# Patient Record
Sex: FEMALE | Race: WHITE | NOT HISPANIC OR LATINO
[De-identification: names, ages, dates, MRNs, and addresses within clinical notes are randomized per-mention and may not be internally consistent; named-entity substitution may affect disease eponyms.]

---

## 2018-10-02 ENCOUNTER — APPOINTMENT (OUTPATIENT)
Dept: MRI IMAGING | Facility: IMAGING CENTER | Age: 68
End: 2018-10-02
Payer: MEDICARE

## 2018-10-02 ENCOUNTER — OUTPATIENT (OUTPATIENT)
Dept: OUTPATIENT SERVICES | Facility: HOSPITAL | Age: 68
LOS: 1 days | End: 2018-10-02
Payer: MEDICARE

## 2018-10-02 DIAGNOSIS — Z00.8 ENCOUNTER FOR OTHER GENERAL EXAMINATION: ICD-10-CM

## 2018-10-02 DIAGNOSIS — Z90.710 ACQUIRED ABSENCE OF BOTH CERVIX AND UTERUS: Chronic | ICD-10-CM

## 2018-10-02 DIAGNOSIS — Z90.49 ACQUIRED ABSENCE OF OTHER SPECIFIED PARTS OF DIGESTIVE TRACT: Chronic | ICD-10-CM

## 2018-10-02 DIAGNOSIS — Z98.89 OTHER SPECIFIED POSTPROCEDURAL STATES: Chronic | ICD-10-CM

## 2018-10-02 PROCEDURE — 82565 ASSAY OF CREATININE: CPT

## 2018-10-02 PROCEDURE — 74182 MRI ABDOMEN W/CONTRAST: CPT

## 2018-10-02 PROCEDURE — 74182 MRI ABDOMEN W/CONTRAST: CPT | Mod: 26

## 2018-10-02 PROCEDURE — A9585: CPT

## 2019-01-06 ENCOUNTER — FORM ENCOUNTER (OUTPATIENT)
Age: 69
End: 2019-01-06

## 2019-01-07 ENCOUNTER — APPOINTMENT (OUTPATIENT)
Dept: RHEUMATOLOGY | Facility: CLINIC | Age: 69
End: 2019-01-07
Payer: MEDICARE

## 2019-01-07 VITALS
WEIGHT: 275 LBS | DIASTOLIC BLOOD PRESSURE: 86 MMHG | BODY MASS INDEX: 46.95 KG/M2 | HEART RATE: 68 BPM | TEMPERATURE: 97.9 F | SYSTOLIC BLOOD PRESSURE: 176 MMHG | OXYGEN SATURATION: 95 % | HEIGHT: 64 IN

## 2019-01-07 DIAGNOSIS — G89.29 PAIN IN LEFT SHOULDER: ICD-10-CM

## 2019-01-07 DIAGNOSIS — R76.8 OTHER SPECIFIED ABNORMAL IMMUNOLOGICAL FINDINGS IN SERUM: ICD-10-CM

## 2019-01-07 DIAGNOSIS — M35.00 SICCA SYNDROME, UNSPECIFIED: ICD-10-CM

## 2019-01-07 DIAGNOSIS — R70.0 ELEVATED ERYTHROCYTE SEDIMENTATION RATE: ICD-10-CM

## 2019-01-07 DIAGNOSIS — M25.512 PAIN IN LEFT SHOULDER: ICD-10-CM

## 2019-01-07 DIAGNOSIS — Z86.79 PERSONAL HISTORY OF OTHER DISEASES OF THE CIRCULATORY SYSTEM: ICD-10-CM

## 2019-01-07 DIAGNOSIS — Z86.39 PERSONAL HISTORY OF OTHER ENDOCRINE, NUTRITIONAL AND METABOLIC DISEASE: ICD-10-CM

## 2019-01-07 DIAGNOSIS — Z82.49 FAMILY HISTORY OF ISCHEMIC HEART DISEASE AND OTHER DISEASES OF THE CIRCULATORY SYSTEM: ICD-10-CM

## 2019-01-07 DIAGNOSIS — M17.0 BILATERAL PRIMARY OSTEOARTHRITIS OF KNEE: ICD-10-CM

## 2019-01-07 PROCEDURE — 73030 X-RAY EXAM OF SHOULDER: CPT | Mod: LT

## 2019-01-07 PROCEDURE — 99204 OFFICE O/P NEW MOD 45 MIN: CPT

## 2019-01-07 RX ORDER — INSULIN DEGLUDEC INJECTION 100 U/ML
100 INJECTION, SOLUTION SUBCUTANEOUS
Qty: 15 | Refills: 0 | Status: ACTIVE | COMMUNITY
Start: 2018-08-28

## 2019-01-07 RX ORDER — METOPROLOL SUCCINATE 50 MG/1
50 TABLET, EXTENDED RELEASE ORAL
Refills: 0 | Status: ACTIVE | COMMUNITY

## 2019-01-07 RX ORDER — INSULIN ASPART 100 [IU]/ML
INJECTION, SOLUTION INTRAVENOUS; SUBCUTANEOUS
Refills: 0 | Status: ACTIVE | COMMUNITY

## 2019-01-07 RX ORDER — LEVOFLOXACIN 250 MG/1
250 TABLET, FILM COATED ORAL
Qty: 14 | Refills: 0 | Status: ACTIVE | COMMUNITY
Start: 2018-07-21

## 2019-01-07 RX ORDER — ASPIRIN 81 MG
81 TABLET, DELAYED RELEASE (ENTERIC COATED) ORAL
Refills: 0 | Status: ACTIVE | COMMUNITY

## 2019-01-07 NOTE — HISTORY OF PRESENT ILLNESS
[Chills] : chills [Fatigue] : fatigue [Depression] : depression [Malar Facial Rash] : malar facial rash [Dry Mouth] : dry mouth [Difficulty Standing] : difficulty standing [Difficulty Walking] : difficulty walking [Myalgias] : myalgias [Dry Eyes] : dry eyes [Anorexia] : no anorexia [Weight Loss] : no weight loss [Malaise] : no malaise [Fever] : no fever [Skin Lesions] : no lesions [Skin Nodules] : no skin nodules [Oral Ulcers] : no oral ulcers [Cough] : no cough [Dysphonia] : no dysphonia [Dysphagia] : no dysphagia [Shortness of Breath] : no shortness of breath [Falls] : no falls [Dyspnea] : no dyspnea [Muscle Weakness] : no muscle weakness [Muscle Spasms] : no muscle spasms [Muscle Cramping] : no muscle cramping [Visual Changes] : no visual changes [Eye Pain] : no eye pain [Eye Redness] : no eye redness [FreeTextEntry1] : No Raynaud's, no miscarriages, no thrombosis.

## 2019-01-07 NOTE — CONSULT LETTER
[Dear  ___] : Dear  [unfilled], [Consult Letter:] : I had the pleasure of evaluating your patient, [unfilled]. [Please see my note below.] : Please see my note below. [Consult Closing:] : Thank you very much for allowing me to participate in the care of this patient.  If you have any questions, please do not hesitate to contact me. [Sincerely,] : Sincerely, [FreeTextEntry3] : Dr. Silvia Simpson \par Rheumatology attending\par [facility]\par

## 2019-01-07 NOTE — ASSESSMENT
[FreeTextEntry1] : 1. Rash--doubt SLE related rash, most consistent with rosacea clinically\par 2. Joint pain--evidence of DJD on x-rays in the past\par obtain x-ray of the left shoulder although will likely need additional imaging such as MRI if x-ray is negative; suspect adhesive capsulitis\par 3. given joint pain, sicca symptoms, prolonged morning stiffness, and positive ds DNA antibodies, will check additional serology to evaluated for possible SLE/RA\par will call with results\par OV 6 weeks, sooner PRN

## 2019-01-11 LAB
25(OH)D3 SERPL-MCNC: 19.5 NG/ML
ALBUMIN SERPL ELPH-MCNC: 3.9 G/DL
ALP BLD-CCNC: 107 U/L
ALT SERPL-CCNC: 22 U/L
ANA PAT FLD IF-IMP: ABNORMAL
ANA SER IF-ACNC: ABNORMAL
ANION GAP SERPL CALC-SCNC: 13 MMOL/L
APPEARANCE: CLEAR
APTT BLD: 32.3 SEC
AST SERPL-CCNC: 14 U/L
B2 GLYCOPROT1 AB SER QL: NEGATIVE
BACTERIA: ABNORMAL
BASOPHILS # BLD AUTO: 0.03 K/UL
BASOPHILS NFR BLD AUTO: 0.3 %
BILIRUB SERPL-MCNC: 0.4 MG/DL
BILIRUBIN URINE: NEGATIVE
BLOOD URINE: NEGATIVE
BUN SERPL-MCNC: 24 MG/DL
CALCIUM SERPL-MCNC: 9.6 MG/DL
CARDIOLIPIN AB SER IA-ACNC: NEGATIVE
CCP AB SER IA-ACNC: <8 UNITS
CENTROMERE IGG SER-ACNC: <0.2 AL
CHLORIDE SERPL-SCNC: 97 MMOL/L
CO2 SERPL-SCNC: 27 MMOL/L
COLOR: YELLOW
CREAT SERPL-MCNC: 1.08 MG/DL
CREAT SPEC-SCNC: 81 MG/DL
CREAT/PROT UR: 0.3 RATIO
CRP SERPL-MCNC: 2.97 MG/DL
DSDNA AB SER-ACNC: 23 IU/ML
ENA RNP AB SER IA-ACNC: <0.2 AL
ENA SCL70 IGG SER IA-ACNC: <0.2 AL
ENA SM AB SER IA-ACNC: <0.2 AL
ENA SS-A AB SER IA-ACNC: 0.2 AL
ENA SS-B AB SER IA-ACNC: <0.2 AL
EOSINOPHIL # BLD AUTO: 0.5 K/UL
EOSINOPHIL NFR BLD AUTO: 4.5 %
ERYTHROCYTE [SEDIMENTATION RATE] IN BLOOD BY WESTERGREN METHOD: 110 MM/HR
FOLATE SERPL-MCNC: 7.4 NG/ML
GLUCOSE QUALITATIVE U: 500 MG/DL
GLUCOSE SERPL-MCNC: 213 MG/DL
HCT VFR BLD CALC: 43.7 %
HGB BLD-MCNC: 13.7 G/DL
IMM GRANULOCYTES NFR BLD AUTO: 0.3 %
KETONES URINE: NEGATIVE
LEUKOCYTE ESTERASE URINE: NEGATIVE
LYMPHOCYTES # BLD AUTO: 2.13 K/UL
LYMPHOCYTES NFR BLD AUTO: 19.3 %
MAN DIFF?: NORMAL
MCHC RBC-ENTMCNC: 27.8 PG
MCHC RBC-ENTMCNC: 31.4 GM/DL
MCV RBC AUTO: 88.6 FL
MICROSCOPIC-UA: NORMAL
MONOCYTES # BLD AUTO: 0.73 K/UL
MONOCYTES NFR BLD AUTO: 6.6 %
NEUTROPHILS # BLD AUTO: 7.59 K/UL
NEUTROPHILS NFR BLD AUTO: 69 %
NITRITE URINE: NEGATIVE
PH URINE: 6.5
PLATELET # BLD AUTO: 288 K/UL
POTASSIUM SERPL-SCNC: 5.1 MMOL/L
PROT SERPL-MCNC: 8 G/DL
PROT UR-MCNC: 23 MG/DL
PROTEIN URINE: NEGATIVE MG/DL
RBC # BLD: 4.93 M/UL
RBC # FLD: 14.9 %
RED BLOOD CELLS URINE: 1 /HPF
RF+CCP IGG SER-IMP: NEGATIVE
RHEUMATOID FACT SER QL: <10 IU/ML
SODIUM SERPL-SCNC: 137 MMOL/L
SPECIFIC GRAVITY URINE: 1.02
SQUAMOUS EPITHELIAL CELLS: 2 /HPF
THYROGLOB AB SERPL-ACNC: <20 IU/ML
THYROPEROXIDASE AB SERPL IA-ACNC: 13.6 IU/ML
TSH SERPL-ACNC: 2.36 UIU/ML
UROBILINOGEN URINE: NEGATIVE MG/DL
VIT B12 SERPL-MCNC: 564 PG/ML
WBC # FLD AUTO: 11.01 K/UL
WHITE BLOOD CELLS URINE: 4 /HPF

## 2019-01-14 DIAGNOSIS — R53.83 OTHER FATIGUE: ICD-10-CM

## 2019-01-14 DIAGNOSIS — R70.0 ELEVATED ERYTHROCYTE SEDIMENTATION RATE: ICD-10-CM

## 2019-01-23 LAB — RNA POLYMERASE III IGG: <10 U

## 2019-03-04 ENCOUNTER — APPOINTMENT (OUTPATIENT)
Dept: RHEUMATOLOGY | Facility: CLINIC | Age: 69
End: 2019-03-04

## 2019-04-27 ENCOUNTER — APPOINTMENT (OUTPATIENT)
Dept: MAMMOGRAPHY | Facility: IMAGING CENTER | Age: 69
End: 2019-04-27
Payer: MEDICARE

## 2019-04-27 ENCOUNTER — OUTPATIENT (OUTPATIENT)
Dept: OUTPATIENT SERVICES | Facility: HOSPITAL | Age: 69
LOS: 1 days | End: 2019-04-27
Payer: MEDICARE

## 2019-04-27 ENCOUNTER — APPOINTMENT (OUTPATIENT)
Dept: ULTRASOUND IMAGING | Facility: IMAGING CENTER | Age: 69
End: 2019-04-27
Payer: MEDICARE

## 2019-04-27 DIAGNOSIS — Z90.49 ACQUIRED ABSENCE OF OTHER SPECIFIED PARTS OF DIGESTIVE TRACT: Chronic | ICD-10-CM

## 2019-04-27 DIAGNOSIS — Z90.710 ACQUIRED ABSENCE OF BOTH CERVIX AND UTERUS: Chronic | ICD-10-CM

## 2019-04-27 DIAGNOSIS — Z00.8 ENCOUNTER FOR OTHER GENERAL EXAMINATION: ICD-10-CM

## 2019-04-27 DIAGNOSIS — Z98.89 OTHER SPECIFIED POSTPROCEDURAL STATES: Chronic | ICD-10-CM

## 2019-04-27 PROCEDURE — 76641 ULTRASOUND BREAST COMPLETE: CPT | Mod: 26,50

## 2019-04-27 PROCEDURE — 77067 SCR MAMMO BI INCL CAD: CPT

## 2019-04-27 PROCEDURE — 77063 BREAST TOMOSYNTHESIS BI: CPT | Mod: 26

## 2019-04-27 PROCEDURE — 77067 SCR MAMMO BI INCL CAD: CPT | Mod: 26

## 2019-04-27 PROCEDURE — 76641 ULTRASOUND BREAST COMPLETE: CPT

## 2019-04-27 PROCEDURE — 77063 BREAST TOMOSYNTHESIS BI: CPT

## 2019-05-13 ENCOUNTER — APPOINTMENT (OUTPATIENT)
Dept: CT IMAGING | Facility: IMAGING CENTER | Age: 69
End: 2019-05-13
Payer: MEDICARE

## 2019-05-13 ENCOUNTER — APPOINTMENT (OUTPATIENT)
Dept: NUCLEAR MEDICINE | Facility: IMAGING CENTER | Age: 69
End: 2019-05-13
Payer: MEDICARE

## 2019-05-13 ENCOUNTER — OUTPATIENT (OUTPATIENT)
Dept: OUTPATIENT SERVICES | Facility: HOSPITAL | Age: 69
LOS: 1 days | End: 2019-05-13
Payer: MEDICARE

## 2019-05-13 DIAGNOSIS — Z00.8 ENCOUNTER FOR OTHER GENERAL EXAMINATION: ICD-10-CM

## 2019-05-13 DIAGNOSIS — Z98.89 OTHER SPECIFIED POSTPROCEDURAL STATES: Chronic | ICD-10-CM

## 2019-05-13 DIAGNOSIS — Z90.49 ACQUIRED ABSENCE OF OTHER SPECIFIED PARTS OF DIGESTIVE TRACT: Chronic | ICD-10-CM

## 2019-05-13 DIAGNOSIS — Z90.710 ACQUIRED ABSENCE OF BOTH CERVIX AND UTERUS: Chronic | ICD-10-CM

## 2019-05-13 PROCEDURE — 70491 CT SOFT TISSUE NECK W/DYE: CPT | Mod: 26

## 2019-05-13 PROCEDURE — A9552: CPT

## 2019-05-13 PROCEDURE — 78816 PET IMAGE W/CT FULL BODY: CPT | Mod: 26,PI

## 2019-05-13 PROCEDURE — 70491 CT SOFT TISSUE NECK W/DYE: CPT

## 2019-05-13 PROCEDURE — 78816 PET IMAGE W/CT FULL BODY: CPT

## 2019-05-13 PROCEDURE — 82565 ASSAY OF CREATININE: CPT

## 2019-07-14 ENCOUNTER — EMERGENCY (EMERGENCY)
Facility: HOSPITAL | Age: 69
LOS: 1 days | Discharge: HOME | End: 2019-07-14
Attending: EMERGENCY MEDICINE | Admitting: EMERGENCY MEDICINE
Payer: MEDICARE

## 2019-07-14 VITALS
OXYGEN SATURATION: 98 % | TEMPERATURE: 99 F | HEART RATE: 74 BPM | SYSTOLIC BLOOD PRESSURE: 196 MMHG | DIASTOLIC BLOOD PRESSURE: 73 MMHG | RESPIRATION RATE: 18 BRPM

## 2019-07-14 VITALS
OXYGEN SATURATION: 99 % | HEART RATE: 71 BPM | SYSTOLIC BLOOD PRESSURE: 214 MMHG | RESPIRATION RATE: 18 BRPM | DIASTOLIC BLOOD PRESSURE: 82 MMHG | TEMPERATURE: 98 F

## 2019-07-14 DIAGNOSIS — Z98.89 OTHER SPECIFIED POSTPROCEDURAL STATES: Chronic | ICD-10-CM

## 2019-07-14 DIAGNOSIS — Z90.710 ACQUIRED ABSENCE OF BOTH CERVIX AND UTERUS: Chronic | ICD-10-CM

## 2019-07-14 DIAGNOSIS — Z90.49 ACQUIRED ABSENCE OF OTHER SPECIFIED PARTS OF DIGESTIVE TRACT: Chronic | ICD-10-CM

## 2019-07-14 LAB
ALBUMIN SERPL ELPH-MCNC: 3.6 G/DL — SIGNIFICANT CHANGE UP (ref 3.3–5)
ALP SERPL-CCNC: 104 U/L — SIGNIFICANT CHANGE UP (ref 40–120)
ALT FLD-CCNC: 23 U/L — SIGNIFICANT CHANGE UP (ref 4–33)
ANION GAP SERPL CALC-SCNC: 12 MMO/L — SIGNIFICANT CHANGE UP (ref 7–14)
AST SERPL-CCNC: 22 U/L — SIGNIFICANT CHANGE UP (ref 4–32)
BASOPHILS # BLD AUTO: 0.05 K/UL — SIGNIFICANT CHANGE UP (ref 0–0.2)
BASOPHILS NFR BLD AUTO: 0.5 % — SIGNIFICANT CHANGE UP (ref 0–2)
BILIRUB SERPL-MCNC: 0.4 MG/DL — SIGNIFICANT CHANGE UP (ref 0.2–1.2)
BUN SERPL-MCNC: 19 MG/DL — SIGNIFICANT CHANGE UP (ref 7–23)
CALCIUM SERPL-MCNC: 9.6 MG/DL — SIGNIFICANT CHANGE UP (ref 8.4–10.5)
CHLORIDE SERPL-SCNC: 100 MMOL/L — SIGNIFICANT CHANGE UP (ref 98–107)
CO2 SERPL-SCNC: 25 MMOL/L — SIGNIFICANT CHANGE UP (ref 22–31)
CREAT SERPL-MCNC: 0.98 MG/DL — SIGNIFICANT CHANGE UP (ref 0.5–1.3)
EOSINOPHIL # BLD AUTO: 0.22 K/UL — SIGNIFICANT CHANGE UP (ref 0–0.5)
EOSINOPHIL NFR BLD AUTO: 2 % — SIGNIFICANT CHANGE UP (ref 0–6)
GLUCOSE SERPL-MCNC: 194 MG/DL — HIGH (ref 70–99)
HCT VFR BLD CALC: 42.7 % — SIGNIFICANT CHANGE UP (ref 34.5–45)
HGB BLD-MCNC: 13.4 G/DL — SIGNIFICANT CHANGE UP (ref 11.5–15.5)
IMM GRANULOCYTES NFR BLD AUTO: 0.5 % — SIGNIFICANT CHANGE UP (ref 0–1.5)
LYMPHOCYTES # BLD AUTO: 2.38 K/UL — SIGNIFICANT CHANGE UP (ref 1–3.3)
LYMPHOCYTES # BLD AUTO: 21.8 % — SIGNIFICANT CHANGE UP (ref 13–44)
MCHC RBC-ENTMCNC: 27.8 PG — SIGNIFICANT CHANGE UP (ref 27–34)
MCHC RBC-ENTMCNC: 31.4 % — LOW (ref 32–36)
MCV RBC AUTO: 88.6 FL — SIGNIFICANT CHANGE UP (ref 80–100)
MONOCYTES # BLD AUTO: 0.74 K/UL — SIGNIFICANT CHANGE UP (ref 0–0.9)
MONOCYTES NFR BLD AUTO: 6.8 % — SIGNIFICANT CHANGE UP (ref 2–14)
NEUTROPHILS # BLD AUTO: 7.48 K/UL — HIGH (ref 1.8–7.4)
NEUTROPHILS NFR BLD AUTO: 68.4 % — SIGNIFICANT CHANGE UP (ref 43–77)
NRBC # FLD: 0 K/UL — SIGNIFICANT CHANGE UP (ref 0–0)
PLATELET # BLD AUTO: 198 K/UL — SIGNIFICANT CHANGE UP (ref 150–400)
PMV BLD: 10.5 FL — SIGNIFICANT CHANGE UP (ref 7–13)
POTASSIUM SERPL-MCNC: 4.6 MMOL/L — SIGNIFICANT CHANGE UP (ref 3.5–5.3)
POTASSIUM SERPL-SCNC: 4.6 MMOL/L — SIGNIFICANT CHANGE UP (ref 3.5–5.3)
PROT SERPL-MCNC: 7.8 G/DL — SIGNIFICANT CHANGE UP (ref 6–8.3)
RBC # BLD: 4.82 M/UL — SIGNIFICANT CHANGE UP (ref 3.8–5.2)
RBC # FLD: 14.3 % — SIGNIFICANT CHANGE UP (ref 10.3–14.5)
SODIUM SERPL-SCNC: 137 MMOL/L — SIGNIFICANT CHANGE UP (ref 135–145)
TROPONIN T, HIGH SENSITIVITY: 8 NG/L — SIGNIFICANT CHANGE UP (ref ?–14)
TROPONIN T, HIGH SENSITIVITY: 9 NG/L — SIGNIFICANT CHANGE UP (ref ?–14)
WBC # BLD: 10.93 K/UL — HIGH (ref 3.8–10.5)
WBC # FLD AUTO: 10.93 K/UL — HIGH (ref 3.8–10.5)

## 2019-07-14 PROCEDURE — 73030 X-RAY EXAM OF SHOULDER: CPT | Mod: 26,LT

## 2019-07-14 PROCEDURE — 93010 ELECTROCARDIOGRAM REPORT: CPT

## 2019-07-14 PROCEDURE — 71046 X-RAY EXAM CHEST 2 VIEWS: CPT | Mod: 26

## 2019-07-14 PROCEDURE — 99285 EMERGENCY DEPT VISIT HI MDM: CPT | Mod: 25

## 2019-07-14 PROCEDURE — 71275 CT ANGIOGRAPHY CHEST: CPT | Mod: 26

## 2019-07-14 NOTE — ED ADULT NURSE NOTE - PMH
Arthritis    Diabetes    HTN (hypertension)    Morbid obesity    Thyroid nodule  bx confirmed benign

## 2019-07-14 NOTE — ED PROVIDER NOTE - NS ED ROS FT
ROS: Denies HA, weakness, dizziness, fevers/chills, nausea/vomiting, SOB, diaphoresis, abdominal pain, diarrhea, joint pain, neuro deficits, dysuria/hematuria, rashes. Admits to chest pain.

## 2019-07-14 NOTE — ED PROVIDER NOTE - ATTENDING CONTRIBUTION TO CARE
Attending Statement: I have personally seen and examined this patient. I have fully participated in the care of this patient. I have reviewed all pertinent clinical information, including history physical exam, plan and the Resident's note and agree except as noted   70yo F hx of HTN, DM, radiation tx for malignant tumor or left eye, from home co chest pain x one day. Three episodes of left sided dull ache radiating to back, left arm, non pleuritic, non exertional. Three episodes since yesterday morning, none now. no associated sob, nausea, palpitations, dizziness. no travel. no trauma. nonsmoker. no AC. took aspirin yesterday. echo was 6months ago, "ok" last stress "years ago"   Vital signs noted. pulse ox 99 RA sitting up, no distress. EOMI. +necrotic mass on the left side below eye, mild erythema. no sign of entrapment. Attending Statement: I have personally seen and examined this patient. I have fully participated in the care of this patient. I have reviewed all pertinent clinical information, including history physical exam, plan and the Resident's note and agree except as noted   70yo F hx of HTN, DM, radiation tx for malignant tumor or left eye, from home co chest pain x one day. Three episodes of left sided dull ache radiating to back, left arm, non pleuritic, non exertional. Three episodes since yesterday morning, none now. no associated sob, nausea, palpitations, dizziness. no travel. no trauma. nonsmoker. no AC. took aspirin yesterday. echo was 6months ago, "ok" last stress "years ago"   Vital signs noted. pulse ox 99 RA sitting up, no distress. EOMI. +necrotic mass on the left side below eye, mild erythema. no sign of entrapment. normal S1-S2 No resp distress. able to speak in full and clear sentences. no wheeze, rales or stridor. soft obese female nt abdomen. no calf tenderness +bl venous stasis   plan ekg, cxr, labs, tele monitoring, ct rule out PE. pt and family state that she is not allergic to IV dye. had a ct neck with IV dye May 2019 no reaction   Dr Hays aware pt in ED, aware of plan

## 2019-07-14 NOTE — ED ADULT TRIAGE NOTE - CHIEF COMPLAINT QUOTE
pt c/o cp that goes straight thru to back and down left arm/ states it started yesterday  pt presently having radiation to left eye for tumor in eye. denies sob

## 2019-07-14 NOTE — ED ADULT NURSE NOTE - OBJECTIVE STATEMENT
Pt received in spot 1, A&OX4, NAD.  c/o L sided chest pain that radiates down L arm, intermittent since yesterday.  Denies any SOB/palpitations/dizziness/nausea.  Endorses HA, denies any vision changes.  NSR on monitor.  Pt currently on radiation for L eye tumor, removal in May of this year.  Small area of black with redness surrounding under L eye, no drainage noted, otherwise pt well-appearing.  Labs sent. Will continue to monitor.

## 2019-07-14 NOTE — ED PROVIDER NOTE - OBJECTIVE STATEMENT
70 y/o F with h/o HTN, DM, and left eye malignancy with recent radiation (7/1-7/3) presents to the ED with c/o episodic left sided chest pain that started yesterday. Patient states she has had 3 episodes of chest pain since yesterday, describes as aching and radiates to back, left upper arm, and neck. Denies SOB, nausea, vomiting, dizziness or syncope. Patient has never had chest pain like this in the past. No h/o MI or angina. Denies leg swelling or GRAHAM.

## 2019-07-14 NOTE — ED PROVIDER NOTE - PHYSICAL EXAMINATION
Gen: Well appearing, NAD  Head: NCAT  HEENT: PERRL, MMM, normal conjunctiva, anicteric, neck supple  Lung: CTAB, no adventitious sounds  CV: RRR, no murmurs, rubs or gallops  Abd: soft, NTND, no rebound or guarding  MSK: Bilateral LE edema with venous stasis changes  Neuro: No focal neurologic deficits. 5/5 strength and normal sensation in all extremities.  Skin: Warm and dry. Venous stasis changes present in bilateral lower extremities to mid-shin  Psych: normal mood and affect

## 2019-07-14 NOTE — ED PROVIDER NOTE - PROGRESS NOTE DETAILS
Spoke with Dr. Hays about patients care - would like her to be evaluated for PE, possible discharge. Discussed that given her high risk for ACS, will likely admit Spoke with Dr. Hays about patients care - would like her to be evaluated for PE, possible discharge. Discussed that given her high risk for ACS, will likely admit/CDU Spoke with Dr. Hays about patients care - would like her to be evaluated for PE, possible discharge. Discussed that given her high risk for ACS, may  likely admit/CDU pt sitting up no distress. no chest pain while in ED. states left shoulder hurt when moving up and down. no sob no GRAHAM. pt and family informed of results. Offered admission pt would prefer to go home. Spoke with Dr Lacy, knows pt and family well. Will see pt in one day, Monday. Stress test in two days, Tuesday. pt has also radiation appt in two days, tuesday morning, doesn't want to miss appt. DW pt and family importance to return for any worsening of symptoms or if wish to be admitted for stress. PT AO3. aware of results and BP in ED

## 2019-07-14 NOTE — ED PROVIDER NOTE - CLINICAL SUMMARY MEDICAL DECISION MAKING FREE TEXT BOX
68 y/o F with h/o HTN, DM, and left eye malignancy with recent radiation treatment (7/1-7/3) presents to the ED with new onset left sided chest pain radiating to her back, neck and left arm. Given comorbidities and malignancy, patient is high risk for ACS and PE. Will order EKG, labs, and CXR to r/o ACS and CTA with IV contrast (allergy status clarified with patient - no allergy to IV contrast, just shelfish) to evaluate for PE.

## 2020-03-16 ENCOUNTER — APPOINTMENT (OUTPATIENT)
Dept: NUCLEAR MEDICINE | Facility: IMAGING CENTER | Age: 70
End: 2020-03-16

## 2020-06-30 ENCOUNTER — OUTPATIENT (OUTPATIENT)
Dept: OUTPATIENT SERVICES | Facility: HOSPITAL | Age: 70
LOS: 1 days | End: 2020-06-30
Payer: MEDICARE

## 2020-06-30 ENCOUNTER — APPOINTMENT (OUTPATIENT)
Dept: NUCLEAR MEDICINE | Facility: IMAGING CENTER | Age: 70
End: 2020-06-30
Payer: MEDICARE

## 2020-06-30 DIAGNOSIS — Z98.89 OTHER SPECIFIED POSTPROCEDURAL STATES: Chronic | ICD-10-CM

## 2020-06-30 DIAGNOSIS — Z90.49 ACQUIRED ABSENCE OF OTHER SPECIFIED PARTS OF DIGESTIVE TRACT: Chronic | ICD-10-CM

## 2020-06-30 DIAGNOSIS — Z90.710 ACQUIRED ABSENCE OF BOTH CERVIX AND UTERUS: Chronic | ICD-10-CM

## 2020-06-30 DIAGNOSIS — Z00.8 ENCOUNTER FOR OTHER GENERAL EXAMINATION: ICD-10-CM

## 2020-06-30 PROCEDURE — 78816 PET IMAGE W/CT FULL BODY: CPT

## 2020-06-30 PROCEDURE — A9552: CPT

## 2020-06-30 PROCEDURE — 78816 PET IMAGE W/CT FULL BODY: CPT | Mod: 26,PS

## 2020-08-04 ENCOUNTER — APPOINTMENT (OUTPATIENT)
Dept: ULTRASOUND IMAGING | Facility: IMAGING CENTER | Age: 70
End: 2020-08-04
Payer: MEDICARE

## 2020-08-04 ENCOUNTER — OUTPATIENT (OUTPATIENT)
Dept: OUTPATIENT SERVICES | Facility: HOSPITAL | Age: 70
LOS: 1 days | End: 2020-08-04
Payer: MEDICARE

## 2020-08-04 ENCOUNTER — RESULT REVIEW (OUTPATIENT)
Age: 70
End: 2020-08-04

## 2020-08-04 DIAGNOSIS — Z90.710 ACQUIRED ABSENCE OF BOTH CERVIX AND UTERUS: Chronic | ICD-10-CM

## 2020-08-04 DIAGNOSIS — Z98.89 OTHER SPECIFIED POSTPROCEDURAL STATES: Chronic | ICD-10-CM

## 2020-08-04 DIAGNOSIS — Z90.49 ACQUIRED ABSENCE OF OTHER SPECIFIED PARTS OF DIGESTIVE TRACT: Chronic | ICD-10-CM

## 2020-08-04 DIAGNOSIS — Z00.8 ENCOUNTER FOR OTHER GENERAL EXAMINATION: ICD-10-CM

## 2020-08-04 PROCEDURE — 10005 FNA BX W/US GDN 1ST LES: CPT

## 2020-08-04 PROCEDURE — 88172 CYTP DX EVAL FNA 1ST EA SITE: CPT

## 2020-08-04 PROCEDURE — 88173 CYTOPATH EVAL FNA REPORT: CPT

## 2020-08-04 PROCEDURE — 88341 IMHCHEM/IMCYTCHM EA ADD ANTB: CPT

## 2020-08-04 PROCEDURE — 88342 IMHCHEM/IMCYTCHM 1ST ANTB: CPT | Mod: 26

## 2020-08-04 PROCEDURE — 88305 TISSUE EXAM BY PATHOLOGIST: CPT

## 2020-08-04 PROCEDURE — 88305 TISSUE EXAM BY PATHOLOGIST: CPT | Mod: 26

## 2020-08-04 PROCEDURE — 88341 IMHCHEM/IMCYTCHM EA ADD ANTB: CPT | Mod: 26

## 2020-08-04 PROCEDURE — 88342 IMHCHEM/IMCYTCHM 1ST ANTB: CPT

## 2020-08-04 PROCEDURE — 88173 CYTOPATH EVAL FNA REPORT: CPT | Mod: 26

## 2020-08-18 ENCOUNTER — APPOINTMENT (OUTPATIENT)
Dept: SURGERY | Facility: CLINIC | Age: 70
End: 2020-08-18
Payer: MEDICARE

## 2020-08-18 VITALS
DIASTOLIC BLOOD PRESSURE: 93 MMHG | HEART RATE: 88 BPM | BODY MASS INDEX: 45.53 KG/M2 | WEIGHT: 270 LBS | HEIGHT: 64.5 IN | SYSTOLIC BLOOD PRESSURE: 184 MMHG

## 2020-08-18 DIAGNOSIS — D10.39 BENIGN NEOPLASM OF OTHER PARTS OF MOUTH: ICD-10-CM

## 2020-08-18 DIAGNOSIS — C44.99 OTHER SPECIFIED MALIGNANT NEOPLASM OF SKIN, UNSPECIFIED: ICD-10-CM

## 2020-08-18 PROCEDURE — 99204 OFFICE O/P NEW MOD 45 MIN: CPT

## 2020-08-19 RX ORDER — NITROFURANTOIN (MONOHYDRATE/MACROCRYSTALS) 25; 75 MG/1; MG/1
100 CAPSULE ORAL
Qty: 14 | Refills: 0 | Status: ACTIVE | COMMUNITY
Start: 2020-07-07

## 2020-08-19 RX ORDER — PREDNISONE 5 MG/1
5 TABLET ORAL
Qty: 120 | Refills: 0 | Status: ACTIVE | COMMUNITY
Start: 2020-05-26

## 2020-08-19 RX ORDER — PEN NEEDLE, DIABETIC 31 G X1/4"
32G X 4 MM NEEDLE, DISPOSABLE MISCELLANEOUS
Qty: 100 | Refills: 0 | Status: ACTIVE | COMMUNITY
Start: 2020-05-14

## 2020-08-19 RX ORDER — METFORMIN HYDROCHLORIDE 500 MG/1
500 TABLET, COATED ORAL
Qty: 120 | Refills: 0 | Status: ACTIVE | COMMUNITY
Start: 2020-03-12

## 2020-08-19 RX ORDER — GLIPIZIDE 5 MG/1
5 TABLET ORAL
Qty: 180 | Refills: 0 | Status: ACTIVE | COMMUNITY
Start: 2020-06-19

## 2020-08-19 RX ORDER — CLINDAMYCIN HYDROCHLORIDE 150 MG/1
150 CAPSULE ORAL
Qty: 20 | Refills: 0 | Status: ACTIVE | COMMUNITY
Start: 2020-07-30

## 2020-08-19 RX ORDER — PREDNISONE 10 MG/1
10 TABLET ORAL
Qty: 90 | Refills: 0 | Status: ACTIVE | COMMUNITY
Start: 2020-04-10

## 2020-08-19 RX ORDER — BLOOD SUGAR DIAGNOSTIC
STRIP MISCELLANEOUS
Qty: 100 | Refills: 0 | Status: ACTIVE | COMMUNITY
Start: 2020-07-23

## 2020-08-19 RX ORDER — AZITHROMYCIN 250 MG/1
250 TABLET, FILM COATED ORAL
Qty: 6 | Refills: 0 | Status: ACTIVE | COMMUNITY
Start: 2020-03-23

## 2020-08-19 NOTE — REASON FOR VISIT
[Initial Consultation] : an initial consultation for [FreeTextEntry2] : metastatic adenopathy [Family Member] : family member

## 2020-08-19 NOTE — HISTORY OF PRESENT ILLNESS
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 recently noted to have left neck node - level 1 on CT.  FNA positive for metastatic carcinoma. denies any symptoms.  PET does not show any other disease

## 2020-08-19 NOTE — PHYSICAL EXAM
[de-identified] : no palpable thyroid nodules [Laryngoscopy Performed] : laryngoscopy was performed, see procedure section for findings [Midline] : located in midline position [de-identified] : 3 mm right soft palate squamous papilloma [Normal] : orientation to person, place, and time: normal [de-identified] : well healed left lower eyelid medial scar [de-identified] : indirect  laryngoscopy shows normal vocal cord mobility bilaterally with no lesions noted [de-identified] : 1 cm left pre facial node, well circumscribed and mobile

## 2020-08-19 NOTE — ASSESSMENT
[FreeTextEntry1] : discussed options for left supraomohyoid neck dissection with probable postop RT.  no need to dissect parotid since lesion is medial eyelid and nothing evident on scans.    risks, benefits and alternatives discussed at length.  I have discussed with the patient the anatomy of the area, the pathophysiology of the disease process and the rationale for surgery.  The attendant risks, possible complications and expected postoperative course have been discussed in detail.  I have given the patient the opportunity to ask questions, and all questions have been answered to the patient's satisfaction, and she wishes to proceed with the planned procedure.  to be scheduled at [facility]\par

## 2020-08-19 NOTE — CONSULT LETTER
[Please see my note below.] : Please see my note below. [Consult Letter:] : I had the pleasure of evaluating your patient, [unfilled]. [Dear  ___] : Dear  [unfilled], [Consult Closing:] : Thank you very much for allowing me to participate in the care of this patient.  If you have any questions, please do not hesitate to contact me. [Sincerely,] : Sincerely, [FreeTextEntry3] : Bonifacio López MD, FACS\par System Director, Endocrine Surgery\par [facility]\par Assistant Clinical Professor of Surgery\par [facility] [employer] [facility]  [FreeTextEntry2] : Dr. Bhavin Hays

## 2020-08-28 ENCOUNTER — OUTPATIENT (OUTPATIENT)
Dept: OUTPATIENT SERVICES | Facility: HOSPITAL | Age: 70
LOS: 1 days | End: 2020-08-28
Payer: MEDICARE

## 2020-08-28 VITALS
SYSTOLIC BLOOD PRESSURE: 200 MMHG | HEART RATE: 78 BPM | TEMPERATURE: 97 F | WEIGHT: 270.07 LBS | HEIGHT: 64.5 IN | DIASTOLIC BLOOD PRESSURE: 100 MMHG | OXYGEN SATURATION: 98 % | RESPIRATION RATE: 16 BRPM

## 2020-08-28 DIAGNOSIS — E11.9 TYPE 2 DIABETES MELLITUS WITHOUT COMPLICATIONS: ICD-10-CM

## 2020-08-28 DIAGNOSIS — Z98.890 OTHER SPECIFIED POSTPROCEDURAL STATES: Chronic | ICD-10-CM

## 2020-08-28 DIAGNOSIS — Z90.49 ACQUIRED ABSENCE OF OTHER SPECIFIED PARTS OF DIGESTIVE TRACT: Chronic | ICD-10-CM

## 2020-08-28 DIAGNOSIS — C77.0 SECONDARY AND UNSPECIFIED MALIGNANT NEOPLASM OF LYMPH NODES OF HEAD, FACE AND NECK: ICD-10-CM

## 2020-08-28 DIAGNOSIS — Z01.818 ENCOUNTER FOR OTHER PREPROCEDURAL EXAMINATION: ICD-10-CM

## 2020-08-28 DIAGNOSIS — M19.90 UNSPECIFIED OSTEOARTHRITIS, UNSPECIFIED SITE: ICD-10-CM

## 2020-08-28 DIAGNOSIS — Z90.710 ACQUIRED ABSENCE OF BOTH CERVIX AND UTERUS: Chronic | ICD-10-CM

## 2020-08-28 DIAGNOSIS — Z98.89 OTHER SPECIFIED POSTPROCEDURAL STATES: Chronic | ICD-10-CM

## 2020-08-28 LAB
ALBUMIN SERPL ELPH-MCNC: 4.1 G/DL — SIGNIFICANT CHANGE UP (ref 3.3–5)
ALP SERPL-CCNC: 103 U/L — SIGNIFICANT CHANGE UP (ref 40–120)
ALT FLD-CCNC: 21 U/L — SIGNIFICANT CHANGE UP (ref 4–33)
ANION GAP SERPL CALC-SCNC: 16 MMO/L — HIGH (ref 7–14)
AST SERPL-CCNC: 17 U/L — SIGNIFICANT CHANGE UP (ref 4–32)
BILIRUB SERPL-MCNC: 0.4 MG/DL — SIGNIFICANT CHANGE UP (ref 0.2–1.2)
BLD GP AB SCN SERPL QL: NEGATIVE — SIGNIFICANT CHANGE UP
BUN SERPL-MCNC: 24 MG/DL — HIGH (ref 7–23)
CALCIUM SERPL-MCNC: 9.7 MG/DL — SIGNIFICANT CHANGE UP (ref 8.4–10.5)
CHLORIDE SERPL-SCNC: 101 MMOL/L — SIGNIFICANT CHANGE UP (ref 98–107)
CO2 SERPL-SCNC: 25 MMOL/L — SIGNIFICANT CHANGE UP (ref 22–31)
CREAT SERPL-MCNC: 0.97 MG/DL — SIGNIFICANT CHANGE UP (ref 0.5–1.3)
GLUCOSE SERPL-MCNC: 135 MG/DL — HIGH (ref 70–99)
HBA1C BLD-MCNC: 9.5 % — HIGH (ref 4–5.6)
HCT VFR BLD CALC: 44.2 % — SIGNIFICANT CHANGE UP (ref 34.5–45)
HGB BLD-MCNC: 13.4 G/DL — SIGNIFICANT CHANGE UP (ref 11.5–15.5)
MCHC RBC-ENTMCNC: 27 PG — SIGNIFICANT CHANGE UP (ref 27–34)
MCHC RBC-ENTMCNC: 30.3 % — LOW (ref 32–36)
MCV RBC AUTO: 88.9 FL — SIGNIFICANT CHANGE UP (ref 80–100)
NRBC # FLD: 0 K/UL — SIGNIFICANT CHANGE UP (ref 0–0)
PLATELET # BLD AUTO: 259 K/UL — SIGNIFICANT CHANGE UP (ref 150–400)
PMV BLD: 11.3 FL — SIGNIFICANT CHANGE UP (ref 7–13)
POTASSIUM SERPL-MCNC: 3.5 MMOL/L — SIGNIFICANT CHANGE UP (ref 3.5–5.3)
POTASSIUM SERPL-SCNC: 3.5 MMOL/L — SIGNIFICANT CHANGE UP (ref 3.5–5.3)
PROT SERPL-MCNC: 8.3 G/DL — SIGNIFICANT CHANGE UP (ref 6–8.3)
RBC # BLD: 4.97 M/UL — SIGNIFICANT CHANGE UP (ref 3.8–5.2)
RBC # FLD: 14.1 % — SIGNIFICANT CHANGE UP (ref 10.3–14.5)
RH IG SCN BLD-IMP: POSITIVE — SIGNIFICANT CHANGE UP
SODIUM SERPL-SCNC: 142 MMOL/L — SIGNIFICANT CHANGE UP (ref 135–145)
WBC # BLD: 14 K/UL — HIGH (ref 3.8–10.5)
WBC # FLD AUTO: 14 K/UL — HIGH (ref 3.8–10.5)

## 2020-08-28 PROCEDURE — ZZZZZ: CPT

## 2020-08-28 PROCEDURE — 93010 ELECTROCARDIOGRAM REPORT: CPT

## 2020-08-28 RX ORDER — SODIUM CHLORIDE 9 MG/ML
1000 INJECTION, SOLUTION INTRAVENOUS
Refills: 0 | Status: DISCONTINUED | OUTPATIENT
Start: 2020-09-02 | End: 2020-09-02

## 2020-08-28 NOTE — H&P PST ADULT - NSICDXPASTMEDICALHX_GEN_ALL_CORE_FT
PAST MEDICAL HISTORY:  Arthritis RA    Diabetes poorly controlled    HTN (hypertension)     Morbid obesity     Thyroid nodule bx confirmed benign PAST MEDICAL HISTORY:  Arthritis RA    Diabetes poorly controlled    H/O eye disorder left ocular ca    HLD (hyperlipidemia)     HTN (hypertension)     Morbid obesity     Thyroid nodule bx confirmed benign

## 2020-08-28 NOTE — H&P PST ADULT - NSANTHOSAYNRD_GEN_A_CORE
No. IRAIS screening performed.  STOP BANG Legend: 0-2 = LOW Risk; 3-4 = INTERMEDIATE Risk; 5-8 = HIGH Risk

## 2020-08-28 NOTE — H&P PST ADULT - NSICDXPASTSURGICALHX_GEN_ALL_CORE_FT
PAST SURGICAL HISTORY:  H/O eye surgery left eye - CA - mild loss of vision    S/P  section x2    S/P cholecystectomy     S/P hysterectomy

## 2020-08-28 NOTE — H&P PST ADULT - VENOUS THROMBOEMBOLISM CURRENT STATUS
(1) other risk factor (includes escalating BMI, pack-years of smoking, diabetes requiring insulin, chemotherapy, female gender and length of surgery)/(2) malignancy (present or previous) (1) swollen legs (current)/(1) varicose veins/(1) other risk factor (includes escalating BMI, pack-years of smoking, diabetes requiring insulin, chemotherapy, female gender and length of surgery)/(2) malignancy (present or previous)

## 2020-08-28 NOTE — H&P PST ADULT - HISTORY OF PRESENT ILLNESS
65 y/o obese female with HTN,  DMT2, while undergoing PST for microscopic hematuria and to undergo diagnostic cystoscopy was found to have abnormal ECG and Stress test.  Pt seen and evaluated by Dr. Hays with recent episodes of palpitations while in PST and increased Toprol XL 50 mg twice daily and referred for cardiac cath.  Pt denies CP or SOB.  Pt pre-medicated with Prednisone 80 last night and 40 this am for IV contrast dye allergy. Pt is a 70 yr old female scheduled for Left Neck Dissection with dr López tentatively 9/2/20 - pt hx left ocular Ca 2019 and recent PET scan noted left neck lymph node activity - pt denies pain or swelling - pt hx morbid obesity,  DM poorly controlled , RA on Prednisone , HTN, HLD. Pt had elevated BP in PST - EKG done and was unchanged from one brought by patient from last week -  pt denied CP or HA at this time. Pt signed out AMA after speaking to daughter who works for Dr Granados - unable to contact MD - and BP decreased to 200/100 - pt to go directly to MD   Pt denies COVID or recent travel   Pt to have COVID preop test

## 2020-08-28 NOTE — H&P PST ADULT - MUSCULOSKELETAL COMMENTS
Pt hx RA - on Prednisone - c/o of pain with activity and walking up to 7/10 Pt c/o of diffuse pain r/t RA - needs assistance with walking

## 2020-08-28 NOTE — H&P PST ADULT - NSICDXFAMILYHX_GEN_ALL_CORE_FT
Advised patient of Dr. Dena Ball note. Patient verbalized understanding. Patient indicated that will stick with diet and exercise. FAMILY HISTORY:  Father  Still living? No  Family history of cerebral hemorrhage, Age at diagnosis: Age Unknown

## 2020-08-28 NOTE — H&P PST ADULT - NSICDXPROBLEM_GEN_ALL_CORE_FT
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  OR Booking notified of jose a precautions, DM and allergy to PCN and shellfish   Pt signed out AMA for elevated BP in PST - call to daughter who works for Dr Granados - unable to reach PCP - pt to have  drive her to office now - daughter aware and agrees     Problem: DM (diabetes mellitus)  Assessment and Plan: Pt to take Tresiba 40 u SC HS 9/1/20 , and to take last dose of Metformin and Glipizide 9/1/20 am dose     Problem: Arthritis  Assessment and Plan: Pt to take Prednisone am DOS

## 2020-08-31 ENCOUNTER — APPOINTMENT (OUTPATIENT)
Dept: DISASTER EMERGENCY | Facility: CLINIC | Age: 70
End: 2020-08-31

## 2020-09-01 ENCOUNTER — TRANSCRIPTION ENCOUNTER (OUTPATIENT)
Age: 70
End: 2020-09-01

## 2020-09-01 PROBLEM — Z86.69 PERSONAL HISTORY OF OTHER DISEASES OF THE NERVOUS SYSTEM AND SENSE ORGANS: Chronic | Status: ACTIVE | Noted: 2020-08-28

## 2020-09-01 PROBLEM — E78.5 HYPERLIPIDEMIA, UNSPECIFIED: Chronic | Status: ACTIVE | Noted: 2020-08-28

## 2020-09-01 LAB — SARS-COV-2 N GENE NPH QL NAA+PROBE: NOT DETECTED

## 2020-09-02 ENCOUNTER — RESULT REVIEW (OUTPATIENT)
Age: 70
End: 2020-09-02

## 2020-09-02 ENCOUNTER — INPATIENT (INPATIENT)
Facility: HOSPITAL | Age: 70
LOS: 0 days | Discharge: HOME | End: 2020-09-02
Attending: SPECIALIST | Admitting: SPECIALIST
Payer: MEDICARE

## 2020-09-02 ENCOUNTER — APPOINTMENT (OUTPATIENT)
Dept: SURGERY | Facility: HOSPITAL | Age: 70
End: 2020-09-02

## 2020-09-02 VITALS
OXYGEN SATURATION: 98 % | RESPIRATION RATE: 17 BRPM | HEART RATE: 72 BPM | DIASTOLIC BLOOD PRESSURE: 75 MMHG | SYSTOLIC BLOOD PRESSURE: 122 MMHG

## 2020-09-02 VITALS
OXYGEN SATURATION: 98 % | WEIGHT: 270.07 LBS | HEIGHT: 64 IN | SYSTOLIC BLOOD PRESSURE: 197 MMHG | RESPIRATION RATE: 16 BRPM | TEMPERATURE: 98 F | HEART RATE: 72 BPM | DIASTOLIC BLOOD PRESSURE: 76 MMHG

## 2020-09-02 DIAGNOSIS — Z90.49 ACQUIRED ABSENCE OF OTHER SPECIFIED PARTS OF DIGESTIVE TRACT: Chronic | ICD-10-CM

## 2020-09-02 DIAGNOSIS — Z90.710 ACQUIRED ABSENCE OF BOTH CERVIX AND UTERUS: Chronic | ICD-10-CM

## 2020-09-02 DIAGNOSIS — Z98.890 OTHER SPECIFIED POSTPROCEDURAL STATES: Chronic | ICD-10-CM

## 2020-09-02 DIAGNOSIS — Z98.89 OTHER SPECIFIED POSTPROCEDURAL STATES: Chronic | ICD-10-CM

## 2020-09-02 DIAGNOSIS — C77.0 SECONDARY AND UNSPECIFIED MALIGNANT NEOPLASM OF LYMPH NODES OF HEAD, FACE AND NECK: ICD-10-CM

## 2020-09-02 LAB — GLUCOSE BLDC GLUCOMTR-MCNC: 172 MG/DL — HIGH (ref 70–99)

## 2020-09-02 PROCEDURE — 88360 TUMOR IMMUNOHISTOCHEM/MANUAL: CPT | Mod: 26

## 2020-09-02 PROCEDURE — 88307 TISSUE EXAM BY PATHOLOGIST: CPT | Mod: 26

## 2020-09-02 PROCEDURE — 38724 REMOVAL OF LYMPH NODES NECK: CPT

## 2020-09-02 RX ORDER — OXYCODONE AND ACETAMINOPHEN 5; 325 MG/1; MG/1
1 TABLET ORAL EVERY 6 HOURS
Refills: 0 | Status: DISCONTINUED | OUTPATIENT
Start: 2020-09-02 | End: 2020-09-02

## 2020-09-02 RX ORDER — ATORVASTATIN CALCIUM 80 MG/1
1 TABLET, FILM COATED ORAL
Qty: 0 | Refills: 0 | DISCHARGE

## 2020-09-02 RX ORDER — ACETAMINOPHEN 500 MG
650 TABLET ORAL EVERY 6 HOURS
Refills: 0 | Status: DISCONTINUED | OUTPATIENT
Start: 2020-09-02 | End: 2020-09-02

## 2020-09-02 RX ORDER — ONDANSETRON 8 MG/1
4 TABLET, FILM COATED ORAL ONCE
Refills: 0 | Status: COMPLETED | OUTPATIENT
Start: 2020-09-02 | End: 2020-09-02

## 2020-09-02 RX ORDER — OXYCODONE AND ACETAMINOPHEN 5; 325 MG/1; MG/1
5-325 TABLET ORAL EVERY 6 HOURS
Qty: 12 | Refills: 0 | Status: ACTIVE | COMMUNITY
Start: 2020-09-02 | End: 1900-01-01

## 2020-09-02 RX ORDER — INSULIN DEGLUDEC 100 U/ML
0 INJECTION, SOLUTION SUBCUTANEOUS
Qty: 0 | Refills: 0 | DISCHARGE

## 2020-09-02 RX ORDER — FENTANYL CITRATE 50 UG/ML
25 INJECTION INTRAVENOUS
Refills: 0 | Status: DISCONTINUED | OUTPATIENT
Start: 2020-09-02 | End: 2020-09-02

## 2020-09-02 RX ORDER — BENZOCAINE AND MENTHOL 5; 1 G/100ML; G/100ML
1 LIQUID ORAL
Refills: 0 | Status: DISCONTINUED | OUTPATIENT
Start: 2020-09-02 | End: 2020-09-02

## 2020-09-02 RX ORDER — ASPIRIN/CALCIUM CARB/MAGNESIUM 324 MG
1 TABLET ORAL
Qty: 0 | Refills: 0 | DISCHARGE

## 2020-09-02 RX ORDER — METFORMIN HYDROCHLORIDE 850 MG/1
1 TABLET ORAL
Qty: 0 | Refills: 0 | DISCHARGE

## 2020-09-02 RX ADMIN — Medication 650 MILLIGRAM(S): at 15:46

## 2020-09-02 RX ADMIN — FENTANYL CITRATE 25 MICROGRAM(S): 50 INJECTION INTRAVENOUS at 12:59

## 2020-09-02 RX ADMIN — BENZOCAINE AND MENTHOL 1 LOZENGE: 5; 1 LIQUID ORAL at 15:59

## 2020-09-02 RX ADMIN — ONDANSETRON 4 MILLIGRAM(S): 8 TABLET, FILM COATED ORAL at 13:00

## 2020-09-02 RX ADMIN — FENTANYL CITRATE 25 MICROGRAM(S): 50 INJECTION INTRAVENOUS at 13:30

## 2020-09-02 RX ADMIN — Medication 650 MILLIGRAM(S): at 16:15

## 2020-09-02 NOTE — BRIEF OPERATIVE NOTE - NSICDXBRIEFPOSTOP_GEN_ALL_CORE_FT
POST-OP DIAGNOSIS:  Secondary and unspecified malignant neoplasm of lymph nodes of head, face, and neck 02-Sep-2020 10:49:08  Magdi Bryant

## 2020-09-02 NOTE — ASU DISCHARGE PLAN (ADULT/PEDIATRIC) - B. DRINK ALCOHOL, BEER, OR WINE
Spoke with patient.  She did have an injury and does feel that it is not improving.   Since she has not been seen and there was an injury, it is suggested she be seen.  Transferred to scheduling.  Marisela Rodríguez MS ATC     Statement Selected

## 2020-09-02 NOTE — ASU PREOP CHECKLIST - COMMENTS
pt took norvasc, toprol and pepcid at 5 am with a sip of water pt took norvasc, toprol, prednisone and pepcid at 5 am with a sip of water

## 2020-09-02 NOTE — ASU DISCHARGE PLAN (ADULT/PEDIATRIC) - CALL YOUR DOCTOR IF YOU HAVE ANY OF THE FOLLOWING:
Nausea and vomiting that does not stop/Numbness, tingling, color or temperature change to extremity/Pain not relieved by Medications/Fever greater than (need to indicate Fahrenheit or Celsius)/Bleeding that does not stop/Swelling that gets worse/Unable to urinate/Wound/Surgical Site with redness, or foul smelling discharge or pus

## 2020-09-02 NOTE — BRIEF OPERATIVE NOTE - NSICDXBRIEFPREOP_GEN_ALL_CORE_FT
PRE-OP DIAGNOSIS:  Secondary and unspecified malignant neoplasm of lymph nodes of head, face, and neck 02-Sep-2020 10:48:38  Magdi Bryant

## 2020-09-02 NOTE — ASU DISCHARGE PLAN (ADULT/PEDIATRIC) - CARE PROVIDER_API CALL
Bonifacio López  PLASTIC SURGERY  [address] [place] [address] [address]  [place] [address]  Phone: [phone]  Fax: [phone]  Follow Up Time:

## 2020-09-08 ENCOUNTER — APPOINTMENT (OUTPATIENT)
Dept: SURGERY | Facility: CLINIC | Age: 70
End: 2020-09-08
Payer: MEDICARE

## 2020-09-08 LAB — SURGICAL PATHOLOGY STUDY: SIGNIFICANT CHANGE UP

## 2020-09-08 PROCEDURE — 99024 POSTOP FOLLOW-UP VISIT: CPT

## 2020-09-08 NOTE — HISTORY OF PRESENT ILLNESS
[de-identified] : Pt 6 days s/p left supraomohyoid neck dissection states drain less than 30 cc for 24 hour doing well without complaints

## 2020-09-08 NOTE — ASSESSMENT
[FreeTextEntry1] : s/p left supraomohyoid neck dissection\par daily care\par drain removed\par call for path \par f/u 3 weeks

## 2020-10-01 ENCOUNTER — APPOINTMENT (OUTPATIENT)
Dept: SURGERY | Facility: CLINIC | Age: 70
End: 2020-10-01
Payer: MEDICARE

## 2020-10-01 DIAGNOSIS — C77.0 SECONDARY AND UNSPECIFIED MALIGNANT NEOPLASM OF LYMPH NODES OF HEAD, FACE AND NECK: ICD-10-CM

## 2020-10-01 PROCEDURE — 99024 POSTOP FOLLOW-UP VISIT: CPT

## 2020-10-01 NOTE — ASSESSMENT
[FreeTextEntry1] : s/p Left supraomohyoid neck dissection\par daily care\par f/u 4 months\par f/u Dr Marin and Dr Rodriguez as scheduled

## 2020-10-01 NOTE — HISTORY OF PRESENT ILLNESS
[de-identified] : Pt 1 month s/p left supraomohyoid neck dissection doing well without complaints. Pt states she was referred to Dr Rordiguez for RT and was told RT not needed at this time will follow up with Her and Dr Marin

## 2020-10-01 NOTE — PHYSICAL EXAM
[de-identified] : well healed scar [Midline] : located in midline position [Normal] : orientation to person, place, and time: normal

## 2020-12-04 ENCOUNTER — INPATIENT (INPATIENT)
Facility: HOSPITAL | Age: 70
LOS: 5 days | Discharge: HOME | DRG: 177 | End: 2020-12-10
Attending: STUDENT IN AN ORGANIZED HEALTH CARE EDUCATION/TRAINING PROGRAM | Admitting: STUDENT IN AN ORGANIZED HEALTH CARE EDUCATION/TRAINING PROGRAM
Payer: MEDICARE

## 2020-12-04 VITALS
OXYGEN SATURATION: 96 % | TEMPERATURE: 99 F | DIASTOLIC BLOOD PRESSURE: 111 MMHG | RESPIRATION RATE: 34 BRPM | WEIGHT: 199.96 LBS | SYSTOLIC BLOOD PRESSURE: 165 MMHG | HEIGHT: 64 IN | HEART RATE: 75 BPM

## 2020-12-04 DIAGNOSIS — Z90.710 ACQUIRED ABSENCE OF BOTH CERVIX AND UTERUS: Chronic | ICD-10-CM

## 2020-12-04 DIAGNOSIS — Z98.89 OTHER SPECIFIED POSTPROCEDURAL STATES: Chronic | ICD-10-CM

## 2020-12-04 DIAGNOSIS — U07.1 COVID-19: ICD-10-CM

## 2020-12-04 DIAGNOSIS — Z90.49 ACQUIRED ABSENCE OF OTHER SPECIFIED PARTS OF DIGESTIVE TRACT: Chronic | ICD-10-CM

## 2020-12-04 DIAGNOSIS — Z98.890 OTHER SPECIFIED POSTPROCEDURAL STATES: Chronic | ICD-10-CM

## 2020-12-04 LAB
ALBUMIN SERPL ELPH-MCNC: 3 G/DL — LOW (ref 3.3–5.2)
ALP SERPL-CCNC: 95 U/L — SIGNIFICANT CHANGE UP (ref 40–120)
ALT FLD-CCNC: 54 U/L — HIGH
ANION GAP SERPL CALC-SCNC: 11 MMOL/L — SIGNIFICANT CHANGE UP (ref 5–17)
APPEARANCE UR: CLEAR — SIGNIFICANT CHANGE UP
APTT BLD: 28 SEC — SIGNIFICANT CHANGE UP (ref 27.5–35.5)
AST SERPL-CCNC: 80 U/L — HIGH
BASE EXCESS BLDV CALC-SCNC: 4.1 MMOL/L — HIGH (ref -2–2)
BASOPHILS # BLD AUTO: 0.02 K/UL — SIGNIFICANT CHANGE UP (ref 0–0.2)
BASOPHILS NFR BLD AUTO: 0.2 % — SIGNIFICANT CHANGE UP (ref 0–2)
BILIRUB SERPL-MCNC: 0.5 MG/DL — SIGNIFICANT CHANGE UP (ref 0.4–2)
BILIRUB UR-MCNC: NEGATIVE — SIGNIFICANT CHANGE UP
BUN SERPL-MCNC: 42 MG/DL — HIGH (ref 8–20)
CALCIUM SERPL-MCNC: 8.9 MG/DL — SIGNIFICANT CHANGE UP (ref 8.6–10.2)
CHLORIDE SERPL-SCNC: 95 MMOL/L — LOW (ref 98–107)
CO2 SERPL-SCNC: 25 MMOL/L — SIGNIFICANT CHANGE UP (ref 22–29)
COLOR SPEC: YELLOW — SIGNIFICANT CHANGE UP
CREAT SERPL-MCNC: 1.21 MG/DL — SIGNIFICANT CHANGE UP (ref 0.5–1.3)
CRP SERPL-MCNC: 21.84 MG/DL — HIGH (ref 0–0.4)
D DIMER BLD IA.RAPID-MCNC: 349 NG/ML DDU — HIGH
DIFF PNL FLD: ABNORMAL
EOSINOPHIL # BLD AUTO: 0.01 K/UL — SIGNIFICANT CHANGE UP (ref 0–0.5)
EOSINOPHIL NFR BLD AUTO: 0.1 % — SIGNIFICANT CHANGE UP (ref 0–6)
FERRITIN SERPL-MCNC: 677 NG/ML — HIGH (ref 15–150)
GAS PNL BLDV: SIGNIFICANT CHANGE UP
GLUCOSE SERPL-MCNC: 149 MG/DL — HIGH (ref 70–99)
GLUCOSE UR QL: NEGATIVE MG/DL — SIGNIFICANT CHANGE UP
HCO3 BLDV-SCNC: 27 MMOL/L — HIGH (ref 20–26)
HCT VFR BLD CALC: 44 % — SIGNIFICANT CHANGE UP (ref 34.5–45)
HGB BLD-MCNC: 14 G/DL — SIGNIFICANT CHANGE UP (ref 11.5–15.5)
IMM GRANULOCYTES NFR BLD AUTO: 0.5 % — SIGNIFICANT CHANGE UP (ref 0–1.5)
INR BLD: 1.05 RATIO — SIGNIFICANT CHANGE UP (ref 0.88–1.16)
KETONES UR-MCNC: NEGATIVE — SIGNIFICANT CHANGE UP
LEUKOCYTE ESTERASE UR-ACNC: NEGATIVE — SIGNIFICANT CHANGE UP
LYMPHOCYTES # BLD AUTO: 1.65 K/UL — SIGNIFICANT CHANGE UP (ref 1–3.3)
LYMPHOCYTES # BLD AUTO: 14.9 % — SIGNIFICANT CHANGE UP (ref 13–44)
MCHC RBC-ENTMCNC: 26.5 PG — LOW (ref 27–34)
MCHC RBC-ENTMCNC: 31.8 GM/DL — LOW (ref 32–36)
MCV RBC AUTO: 83.3 FL — SIGNIFICANT CHANGE UP (ref 80–100)
MONOCYTES # BLD AUTO: 0.97 K/UL — HIGH (ref 0–0.9)
MONOCYTES NFR BLD AUTO: 8.8 % — SIGNIFICANT CHANGE UP (ref 2–14)
NEUTROPHILS # BLD AUTO: 8.34 K/UL — HIGH (ref 1.8–7.4)
NEUTROPHILS NFR BLD AUTO: 75.5 % — SIGNIFICANT CHANGE UP (ref 43–77)
NITRITE UR-MCNC: NEGATIVE — SIGNIFICANT CHANGE UP
PCO2 BLDV: 44 MMHG — SIGNIFICANT CHANGE UP (ref 35–50)
PH BLDV: 7.43 — SIGNIFICANT CHANGE UP (ref 7.32–7.43)
PH UR: 5 — SIGNIFICANT CHANGE UP (ref 5–8)
PLATELET # BLD AUTO: 244 K/UL — SIGNIFICANT CHANGE UP (ref 150–400)
PO2 BLDV: 39 MMHG — SIGNIFICANT CHANGE UP (ref 25–45)
POTASSIUM SERPL-MCNC: 5.9 MMOL/L — HIGH (ref 3.5–5.3)
POTASSIUM SERPL-SCNC: 5.9 MMOL/L — HIGH (ref 3.5–5.3)
PROCALCITONIN SERPL-MCNC: 0.14 NG/ML — HIGH (ref 0.02–0.1)
PROT SERPL-MCNC: 8.1 G/DL — SIGNIFICANT CHANGE UP (ref 6.6–8.7)
PROT UR-MCNC: 100 MG/DL
PROTHROM AB SERPL-ACNC: 12.2 SEC — SIGNIFICANT CHANGE UP (ref 10.6–13.6)
RBC # BLD: 5.28 M/UL — HIGH (ref 3.8–5.2)
RBC # FLD: 15.1 % — HIGH (ref 10.3–14.5)
SAO2 % BLDV: 71 % — SIGNIFICANT CHANGE UP
SODIUM SERPL-SCNC: 130 MMOL/L — LOW (ref 135–145)
SP GR SPEC: 1.02 — SIGNIFICANT CHANGE UP (ref 1.01–1.02)
UROBILINOGEN FLD QL: NEGATIVE MG/DL — SIGNIFICANT CHANGE UP
WBC # BLD: 11.05 K/UL — HIGH (ref 3.8–10.5)
WBC # FLD AUTO: 11.05 K/UL — HIGH (ref 3.8–10.5)

## 2020-12-04 PROCEDURE — 99223 1ST HOSP IP/OBS HIGH 75: CPT | Mod: CS

## 2020-12-04 PROCEDURE — 71045 X-RAY EXAM CHEST 1 VIEW: CPT | Mod: 26

## 2020-12-04 PROCEDURE — 99285 EMERGENCY DEPT VISIT HI MDM: CPT | Mod: CS

## 2020-12-04 RX ORDER — INSULIN HUMAN 100 [IU]/ML
10 INJECTION, SOLUTION SUBCUTANEOUS ONCE
Refills: 0 | Status: COMPLETED | OUTPATIENT
Start: 2020-12-04 | End: 2020-12-04

## 2020-12-04 RX ORDER — DEXAMETHASONE 0.5 MG/5ML
10 ELIXIR ORAL ONCE
Refills: 0 | Status: COMPLETED | OUTPATIENT
Start: 2020-12-04 | End: 2020-12-04

## 2020-12-04 RX ORDER — DEXTROSE 50 % IN WATER 50 %
50 SYRINGE (ML) INTRAVENOUS ONCE
Refills: 0 | Status: COMPLETED | OUTPATIENT
Start: 2020-12-04 | End: 2020-12-04

## 2020-12-04 RX ORDER — ALBUTEROL 90 UG/1
2 AEROSOL, METERED ORAL EVERY 4 HOURS
Refills: 0 | Status: DISCONTINUED | OUTPATIENT
Start: 2020-12-04 | End: 2020-12-10

## 2020-12-04 RX ORDER — CALCIUM GLUCONATE 100 MG/ML
1 VIAL (ML) INTRAVENOUS ONCE
Refills: 0 | Status: COMPLETED | OUTPATIENT
Start: 2020-12-04 | End: 2020-12-04

## 2020-12-04 RX ORDER — ACETAMINOPHEN 500 MG
650 TABLET ORAL EVERY 6 HOURS
Refills: 0 | Status: DISCONTINUED | OUTPATIENT
Start: 2020-12-04 | End: 2020-12-10

## 2020-12-04 RX ORDER — SODIUM CHLORIDE 9 MG/ML
1000 INJECTION INTRAMUSCULAR; INTRAVENOUS; SUBCUTANEOUS
Refills: 0 | Status: COMPLETED | OUTPATIENT
Start: 2020-12-04 | End: 2020-12-04

## 2020-12-04 RX ORDER — ENOXAPARIN SODIUM 100 MG/ML
40 INJECTION SUBCUTANEOUS EVERY 12 HOURS
Refills: 0 | Status: DISCONTINUED | OUTPATIENT
Start: 2020-12-04 | End: 2020-12-10

## 2020-12-04 RX ADMIN — SODIUM CHLORIDE 75 MILLILITER(S): 9 INJECTION INTRAMUSCULAR; INTRAVENOUS; SUBCUTANEOUS at 23:30

## 2020-12-04 RX ADMIN — Medication 102 MILLIGRAM(S): at 20:30

## 2020-12-04 RX ADMIN — Medication 100 GRAM(S): at 23:30

## 2020-12-04 RX ADMIN — Medication 50 MILLILITER(S): at 23:58

## 2020-12-04 RX ADMIN — INSULIN HUMAN 10 UNIT(S): 100 INJECTION, SOLUTION SUBCUTANEOUS at 23:57

## 2020-12-04 NOTE — H&P ADULT - NSHPLABSRESULTS_GEN_ALL_CORE
14.0   11.05 )-----------( 244      ( 04 Dec 2020 21:00 )             44.0     12-04    130<L>  |  95<L>  |  42.0<H>  ----------------------------<  149<H>  5.9<H>   |  25.0  |  1.21    Ca    8.9      04 Dec 2020 21:00    TPro  8.1  /  Alb  3.0<L>  /  TBili  0.5  /  DBili  x   /  AST  80<H>  /  ALT  54<H>  /  AlkPhos  95  12-04    < from: Xray Chest 1 View- PORTABLE-Urgent (12.04.20 @ 19:02) >    FINDINGS:  The lungs show ill-defined the peripheral multifocal airspace disease/infiltrates.. No pneumothorax.    The heart and mediastinum are within normal limits.    Visualized osseous structures are intact.        IMPRESSION:   Bilateral LEFT perihilar peripheral multifocal airspace disease..    < end of copied text >

## 2020-12-04 NOTE — H&P ADULT - NSICDXFAMILYHX_GEN_ALL_CORE_FT
FAMILY HISTORY:  Father  Still living? No  Family history of cerebral hemorrhage, Age at diagnosis: Age Unknown

## 2020-12-04 NOTE — H&P ADULT - HISTORY OF PRESENT ILLNESS
70 yr old female with DM II on insulin, HTN, RA, HLD who presents with acute hypoxemic respiratory failure in the setting of positive COVID in end of November 70 yr old female with DM II on insulin, HTN, RA, HLD, eye cancer (s/p resection earlier this year along with associated LN in the left side of neck) who presents with acute hypoxemic respiratory failure in the setting of positive COVID from 11/30. Patient with progressive symptoms of SOB and cough that brought her to the ED. She was found to be hypoxic into the 88-90% even on 4 LPM of O2 via NC. PAtient able to speak in partial sentences. Denies chest pain, denies abdominal pain, constipation, diarrhea. Endorses cough, non productive and present on deep breath with exertional and at rest SOB. Endorses fever chills at home.

## 2020-12-04 NOTE — ED PROVIDER NOTE - OBJECTIVE STATEMENT
69 yo female pmh diabetes htn comes to ed with 4 days of fever, cough, and shortness of breath; pt noted with o2 sat 80 on RA; pt also noted decreased po intake; denies any vomiting or diarrhea

## 2020-12-04 NOTE — ED ADULT NURSE NOTE - NSIMPLEMENTINTERV_GEN_ALL_ED
Implemented All Universal Safety Interventions:  [place] to call system. Call bell, personal items and telephone within reach. Instruct patient to call for assistance. Room bathroom lighting operational. Non-slip footwear when patient is off stretcher. Physically safe environment: no spills, clutter or unnecessary equipment. Stretcher in lowest position, wheels locked, appropriate side rails in place.

## 2020-12-04 NOTE — H&P ADULT - NSICDXPASTMEDICALHX_GEN_ALL_CORE_FT
PAST MEDICAL HISTORY:  Arthritis RA    Diabetes poorly controlled    H/O eye disorder left ocular ca    HLD (hyperlipidemia)     HTN (hypertension)     Morbid obesity     Thyroid nodule bx confirmed benign

## 2020-12-04 NOTE — H&P ADULT - NSHPREVIEWOFSYSTEMS_GEN_ALL_CORE
REVIEW OF SYSTEMS  General: denies weakness, malaise  Skin/Breast: no new rash  Ophthalmologic: no change in vision  ENMT: no dysphagia, throat pain or change in hearing  Respiratory and Thorax: no difficulty breathing or chest pain  Cardiovascular: no palpitations or PND, orthopnea  Gastrointestinal: no abdominal pain, normal bowel movement, no dark stools  Genitourinary: no difficulty urinating, no burning urination  Musculoskeletal: no myalgia/arthrlagia  Neurological: no weakness, numbness, change in gait  Psychiatric: no depression, anxiety  Hematology/Lymphatics: denies easy bruising or bleeding  Endocrine: no polyuria, polydipsia REVIEW OF SYSTEMS  General: denies weakness, malaise  Skin/Breast: no new rash  Ophthalmologic: no change in vision  ENMT: no dysphagia, throat pain or change in hearing  Respiratory and Thorax: + breathing; no chest pain; +cough  Cardiovascular: no palpitations or PND, orthopnea  Gastrointestinal: no abdominal pain, normal bowel movement, no dark stools  Genitourinary: no difficulty urinating, no burning urination  Musculoskeletal: no myalgia/arthrlagia  Neurological: no weakness, numbness, change in gait  Psychiatric: no depression, anxiety  Hematology/Lymphatics: denies easy bruising or bleeding  Endocrine: no polyuria, polydipsia

## 2020-12-04 NOTE — H&P ADULT - ASSESSMENT
70 yr old female with DM II on insulin, HTN, RA, HLD who presents with acute hypoxemic respiratory failure in the setting of positive COVID in end of November    #acute hypoxic resp failure 2/2 COVID  #hyperkalemia with elevated Cr  #DM II  #RA  #HTN  #DVT ppx 70 yr old female with DM II on insulin, HTN, RA, HLD who presents with acute hypoxemic respiratory failure in the setting of positive COVID. now with acute hypoxic resp failure     #acute hypoxic resp failure 2/2 COVID  - admit to tele  - dexamethasone 6mg IV daiy  - remdesivir  - ID consult for the AM  - hold abx for now given low procal  - d dimer also low for now defer CTA  - lovenox BID    #hyperkalemia with elevated Cr  - will start on fluids for 1 liter  - repeat BMP int he AM    #DM II  - diabetic diet  - insulin   #RA  #HTN  #DVT ppx 70 yr old female with DM II on insulin, HTN, RA, HLD who presents with acute hypoxemic respiratory failure in the setting of positive COVID. now with acute hypoxic resp failure     #acute hypoxic resp failure 2/2 COVID  - admit to tele  - dexamethasone 6mg IV daiy  - remdesivir  - ID consult for the AM - placed through service  - hold abx for now given low procal  - d dimer also low for now defer CTA  - lovenox BID    #hyperkalemia with elevated Cr  - will start on fluids for 1 liter  - repeat BMP int he AM    #DM II  - diabetic diet  - lantus and premeal ordered  - FS TIDAC    #RA - not on dmard  - no acute exacerbation    #HTN  - continue toprol    #DVT ppx  - lovenox BID

## 2020-12-04 NOTE — ED ADULT TRIAGE NOTE - CHIEF COMPLAINT QUOTE
pt is known covid +. sob x few days. today to sob to ambulate. spo2 86% on RA and rr in 30's. improvement with o2. dr. ortiz called to see and pt placed in isolation room.

## 2020-12-05 LAB
ALBUMIN SERPL ELPH-MCNC: 3 G/DL — LOW (ref 3.3–5.2)
ALP SERPL-CCNC: 100 U/L — SIGNIFICANT CHANGE UP (ref 40–120)
ALT FLD-CCNC: 50 U/L — HIGH
ANION GAP SERPL CALC-SCNC: 10 MMOL/L — SIGNIFICANT CHANGE UP (ref 5–17)
ANION GAP SERPL CALC-SCNC: 11 MMOL/L — SIGNIFICANT CHANGE UP (ref 5–17)
ANION GAP SERPL CALC-SCNC: 11 MMOL/L — SIGNIFICANT CHANGE UP (ref 5–17)
AST SERPL-CCNC: 61 U/L — HIGH
BACTERIA # UR AUTO: ABNORMAL
BILIRUB SERPL-MCNC: 0.5 MG/DL — SIGNIFICANT CHANGE UP (ref 0.4–2)
BUN SERPL-MCNC: 41 MG/DL — HIGH (ref 8–20)
BUN SERPL-MCNC: 42 MG/DL — HIGH (ref 8–20)
BUN SERPL-MCNC: 52 MG/DL — HIGH (ref 8–20)
CALCIUM SERPL-MCNC: 9 MG/DL — SIGNIFICANT CHANGE UP (ref 8.6–10.2)
CALCIUM SERPL-MCNC: 9 MG/DL — SIGNIFICANT CHANGE UP (ref 8.6–10.2)
CALCIUM SERPL-MCNC: 9.2 MG/DL — SIGNIFICANT CHANGE UP (ref 8.6–10.2)
CHLORIDE SERPL-SCNC: 96 MMOL/L — LOW (ref 98–107)
CHLORIDE SERPL-SCNC: 96 MMOL/L — LOW (ref 98–107)
CHLORIDE SERPL-SCNC: 98 MMOL/L — SIGNIFICANT CHANGE UP (ref 98–107)
CO2 SERPL-SCNC: 23 MMOL/L — SIGNIFICANT CHANGE UP (ref 22–29)
CO2 SERPL-SCNC: 25 MMOL/L — SIGNIFICANT CHANGE UP (ref 22–29)
CO2 SERPL-SCNC: 25 MMOL/L — SIGNIFICANT CHANGE UP (ref 22–29)
CREAT SERPL-MCNC: 1.21 MG/DL — SIGNIFICANT CHANGE UP (ref 0.5–1.3)
CREAT SERPL-MCNC: 1.29 MG/DL — SIGNIFICANT CHANGE UP (ref 0.5–1.3)
CREAT SERPL-MCNC: 1.3 MG/DL — SIGNIFICANT CHANGE UP (ref 0.5–1.3)
EPI CELLS # UR: SIGNIFICANT CHANGE UP
GLUCOSE BLDC GLUCOMTR-MCNC: 268 MG/DL — HIGH (ref 70–99)
GLUCOSE BLDC GLUCOMTR-MCNC: 311 MG/DL — HIGH (ref 70–99)
GLUCOSE BLDC GLUCOMTR-MCNC: 348 MG/DL — HIGH (ref 70–99)
GLUCOSE BLDC GLUCOMTR-MCNC: 359 MG/DL — HIGH (ref 70–99)
GLUCOSE SERPL-MCNC: 278 MG/DL — HIGH (ref 70–99)
GLUCOSE SERPL-MCNC: 283 MG/DL — HIGH (ref 70–99)
GLUCOSE SERPL-MCNC: 291 MG/DL — HIGH (ref 70–99)
HCT VFR BLD CALC: 41.6 % — SIGNIFICANT CHANGE UP (ref 34.5–45)
HGB BLD-MCNC: 13.3 G/DL — SIGNIFICANT CHANGE UP (ref 11.5–15.5)
MCHC RBC-ENTMCNC: 26.7 PG — LOW (ref 27–34)
MCHC RBC-ENTMCNC: 32 GM/DL — SIGNIFICANT CHANGE UP (ref 32–36)
MCV RBC AUTO: 83.4 FL — SIGNIFICANT CHANGE UP (ref 80–100)
PLATELET # BLD AUTO: 255 K/UL — SIGNIFICANT CHANGE UP (ref 150–400)
POTASSIUM SERPL-MCNC: 4.9 MMOL/L — SIGNIFICANT CHANGE UP (ref 3.5–5.3)
POTASSIUM SERPL-MCNC: 4.9 MMOL/L — SIGNIFICANT CHANGE UP (ref 3.5–5.3)
POTASSIUM SERPL-MCNC: 5 MMOL/L — SIGNIFICANT CHANGE UP (ref 3.5–5.3)
POTASSIUM SERPL-SCNC: 4.9 MMOL/L — SIGNIFICANT CHANGE UP (ref 3.5–5.3)
POTASSIUM SERPL-SCNC: 4.9 MMOL/L — SIGNIFICANT CHANGE UP (ref 3.5–5.3)
POTASSIUM SERPL-SCNC: 5 MMOL/L — SIGNIFICANT CHANGE UP (ref 3.5–5.3)
PROT SERPL-MCNC: 7.6 G/DL — SIGNIFICANT CHANGE UP (ref 6.6–8.7)
RBC # BLD: 4.99 M/UL — SIGNIFICANT CHANGE UP (ref 3.8–5.2)
RBC # FLD: 15 % — HIGH (ref 10.3–14.5)
RBC CASTS # UR COMP ASSIST: SIGNIFICANT CHANGE UP /HPF (ref 0–4)
SARS-COV-2 RNA SPEC QL NAA+PROBE: DETECTED
SODIUM SERPL-SCNC: 131 MMOL/L — LOW (ref 135–145)
SODIUM SERPL-SCNC: 131 MMOL/L — LOW (ref 135–145)
SODIUM SERPL-SCNC: 132 MMOL/L — LOW (ref 135–145)
WBC # BLD: 10.14 K/UL — SIGNIFICANT CHANGE UP (ref 3.8–10.5)
WBC # FLD AUTO: 10.14 K/UL — SIGNIFICANT CHANGE UP (ref 3.8–10.5)
WBC UR QL: SIGNIFICANT CHANGE UP

## 2020-12-05 PROCEDURE — 99223 1ST HOSP IP/OBS HIGH 75: CPT | Mod: CS

## 2020-12-05 PROCEDURE — 99233 SBSQ HOSP IP/OBS HIGH 50: CPT | Mod: CS

## 2020-12-05 RX ORDER — INSULIN GLARGINE 100 [IU]/ML
40 INJECTION, SOLUTION SUBCUTANEOUS AT BEDTIME
Refills: 0 | Status: DISCONTINUED | OUTPATIENT
Start: 2020-12-05 | End: 2020-12-10

## 2020-12-05 RX ORDER — INSULIN ASPART 100 [IU]/ML
0 INJECTION, SOLUTION SUBCUTANEOUS
Qty: 0 | Refills: 0 | DISCHARGE

## 2020-12-05 RX ORDER — SODIUM CHLORIDE 9 MG/ML
1000 INJECTION, SOLUTION INTRAVENOUS
Refills: 0 | Status: DISCONTINUED | OUTPATIENT
Start: 2020-12-05 | End: 2020-12-10

## 2020-12-05 RX ORDER — INSULIN LISPRO 100/ML
VIAL (ML) SUBCUTANEOUS
Refills: 0 | Status: DISCONTINUED | OUTPATIENT
Start: 2020-12-05 | End: 2020-12-10

## 2020-12-05 RX ORDER — DEXAMETHASONE 0.5 MG/5ML
6 ELIXIR ORAL DAILY
Refills: 0 | Status: DISCONTINUED | OUTPATIENT
Start: 2020-12-05 | End: 2020-12-10

## 2020-12-05 RX ORDER — DEXTROSE 50 % IN WATER 50 %
12.5 SYRINGE (ML) INTRAVENOUS ONCE
Refills: 0 | Status: DISCONTINUED | OUTPATIENT
Start: 2020-12-05 | End: 2020-12-10

## 2020-12-05 RX ORDER — INSULIN LISPRO 100/ML
3 VIAL (ML) SUBCUTANEOUS
Refills: 0 | Status: DISCONTINUED | OUTPATIENT
Start: 2020-12-05 | End: 2020-12-10

## 2020-12-05 RX ORDER — REMDESIVIR 5 MG/ML
100 INJECTION INTRAVENOUS EVERY 24 HOURS
Refills: 0 | Status: COMPLETED | OUTPATIENT
Start: 2020-12-06 | End: 2020-12-09

## 2020-12-05 RX ORDER — DEXTROSE 50 % IN WATER 50 %
15 SYRINGE (ML) INTRAVENOUS ONCE
Refills: 0 | Status: DISCONTINUED | OUTPATIENT
Start: 2020-12-05 | End: 2020-12-10

## 2020-12-05 RX ORDER — GLUCAGON INJECTION, SOLUTION 0.5 MG/.1ML
1 INJECTION, SOLUTION SUBCUTANEOUS ONCE
Refills: 0 | Status: DISCONTINUED | OUTPATIENT
Start: 2020-12-05 | End: 2020-12-10

## 2020-12-05 RX ORDER — INSULIN LISPRO 100/ML
VIAL (ML) SUBCUTANEOUS AT BEDTIME
Refills: 0 | Status: DISCONTINUED | OUTPATIENT
Start: 2020-12-05 | End: 2020-12-10

## 2020-12-05 RX ORDER — REMDESIVIR 5 MG/ML
INJECTION INTRAVENOUS
Refills: 0 | Status: COMPLETED | OUTPATIENT
Start: 2020-12-05 | End: 2020-12-09

## 2020-12-05 RX ORDER — METOPROLOL TARTRATE 50 MG
50 TABLET ORAL DAILY
Refills: 0 | Status: DISCONTINUED | OUTPATIENT
Start: 2020-12-05 | End: 2020-12-06

## 2020-12-05 RX ORDER — DEXTROSE 50 % IN WATER 50 %
25 SYRINGE (ML) INTRAVENOUS ONCE
Refills: 0 | Status: DISCONTINUED | OUTPATIENT
Start: 2020-12-05 | End: 2020-12-10

## 2020-12-05 RX ORDER — REMDESIVIR 5 MG/ML
200 INJECTION INTRAVENOUS EVERY 24 HOURS
Refills: 0 | Status: COMPLETED | OUTPATIENT
Start: 2020-12-05 | End: 2020-12-05

## 2020-12-05 RX ADMIN — Medication 3 UNIT(S): at 09:28

## 2020-12-05 RX ADMIN — Medication 4: at 21:28

## 2020-12-05 RX ADMIN — Medication 50 MILLIGRAM(S): at 22:01

## 2020-12-05 RX ADMIN — INSULIN GLARGINE 40 UNIT(S): 100 INJECTION, SOLUTION SUBCUTANEOUS at 21:29

## 2020-12-05 RX ADMIN — Medication 10: at 09:29

## 2020-12-05 RX ADMIN — Medication 8: at 11:58

## 2020-12-05 RX ADMIN — ENOXAPARIN SODIUM 40 MILLIGRAM(S): 100 INJECTION SUBCUTANEOUS at 11:57

## 2020-12-05 RX ADMIN — REMDESIVIR 500 MILLIGRAM(S): 5 INJECTION INTRAVENOUS at 02:32

## 2020-12-05 RX ADMIN — Medication 6: at 16:54

## 2020-12-05 RX ADMIN — Medication 3 UNIT(S): at 11:57

## 2020-12-05 RX ADMIN — ENOXAPARIN SODIUM 40 MILLIGRAM(S): 100 INJECTION SUBCUTANEOUS at 01:13

## 2020-12-05 RX ADMIN — Medication 600 MILLIGRAM(S): at 17:07

## 2020-12-05 RX ADMIN — Medication 600 MILLIGRAM(S): at 06:06

## 2020-12-05 RX ADMIN — Medication 6 MILLIGRAM(S): at 06:06

## 2020-12-05 RX ADMIN — Medication 3 UNIT(S): at 16:53

## 2020-12-05 NOTE — PROGRESS NOTE ADULT - SUBJECTIVE AND OBJECTIVE BOX
[facility] Division of Hospital Medicine  Samson Gomes [phone]    Chief Complaint:  Patient is a 70y old  Female who presents with a chief complaint of     SUBJECTIVE / OVERNIGHT EVENTS:  Patient seen and examined at bedside. No acute events reported overnight. Pt reports mild improvement, no new complaints.    MEDICATIONS  (STANDING):  dexAMETHasone  Injectable 6 milliGRAM(s) IV Push daily  dextrose 40% Gel 15 Gram(s) Oral once  dextrose 5%. 1000 milliLiter(s) (50 mL/Hr) IV Continuous <Continuous>  dextrose 5%. 1000 milliLiter(s) (100 mL/Hr) IV Continuous <Continuous>  dextrose 50% Injectable 25 Gram(s) IV Push once  dextrose 50% Injectable 12.5 Gram(s) IV Push once  dextrose 50% Injectable 25 Gram(s) IV Push once  enoxaparin Injectable 40 milliGRAM(s) SubCutaneous every 12 hours  glucagon  Injectable 1 milliGRAM(s) IntraMuscular once  guaiFENesin  milliGRAM(s) Oral every 12 hours  insulin glargine Injectable (LANTUS) 40 Unit(s) SubCutaneous at bedtime  insulin lispro (ADMELOG) corrective regimen sliding scale   SubCutaneous three times a day before meals  insulin lispro (ADMELOG) corrective regimen sliding scale   SubCutaneous at bedtime  insulin lispro Injectable (ADMELOG) 3 Unit(s) SubCutaneous three times a day before meals  remdesivir  IVPB   IV Intermittent     MEDICATIONS  (PRN):  acetaminophen   Tablet .. 650 milliGRAM(s) Oral every 6 hours PRN Temp greater or equal to 38C (100.4F), Mild Pain (1 - 3)  ALBUTerol    90 MICROgram(s) HFA Inhaler 2 Puff(s) Inhalation every 4 hours PRN Shortness of Breath and/or Wheezing        I&O's Summary      PHYSICAL EXAM:  Vital Signs Last 24 Hrs  T(C): 36.9 (05 Dec 2020 11:15), Max: 37.3 (04 Dec 2020 18:13)  T(F): 98.5 (05 Dec 2020 11:15), Max: 99.1 (04 Dec 2020 18:13)  HR: 65 (05 Dec 2020 11:15) (65 - 92)  BP: 154/78 (05 Dec 2020 11:15) (129/68 - 165/111)  BP(mean): --  RR: 20 (05 Dec 2020 11:15) (20 - 34)  SpO2: 91% (05 Dec 2020 11:15) (91% - 96%)        CONSTITUTIONAL: NAD, well-developed, well-groomed  HEENT: NC/AT, PERRL, no JVD  RESPIRATORY: CTA bilaterally, normal effort  CARDIOVASCULAR: RRR, S1/S2+, no m/g/r  ABDOMEN: Nontender to palpation, normoactive bowel sounds, no rebound/guarding; No hepatosplenomegaly  MUSCLOSKELETAL: No edema, cyanosis or deformities.  PSYCH: A+O to person, place, and time; affect appropriate  NEUROLOGY: CN 2-12 are intact and symmetric; no gross neurological deficits.  SKIN: No rashes; no palpable lesions  VASC: Distal pulses palpable    LABS:                        13.3   10.14 )-----------( 255      ( 05 Dec 2020 02:16 )             41.6     12-05    132<L>  |  96<L>  |  41.0<H>  ----------------------------<  278<H>  4.9   |  25.0  |  1.29    Ca    9.0      05 Dec 2020 02:17    TPro  7.6  /  Alb  3.0<L>  /  TBili  0.5  /  DBili  x   /  AST  61<H>  /  ALT  50<H>  /  AlkPhos  100  12-05    PT/INR - ( 04 Dec 2020 21:00 )   PT: 12.2 sec;   INR: 1.05 ratio         PTT - ( 04 Dec 2020 21:00 )  PTT:28.0 sec      Urinalysis Basic - ( 04 Dec 2020 23:56 )    Color: Yellow / Appearance: Clear / S.020 / pH: x  Gluc: x / Ketone: Negative  / Bili: Negative / Urobili: Negative mg/dL   Blood: x / Protein: 100 mg/dL / Nitrite: Negative   Leuk Esterase: Negative / RBC: 0-2 /HPF / WBC 0-2   Sq Epi: x / Non Sq Epi: Occasional / Bacteria: Many        CAPILLARY BLOOD GLUCOSE  237 (05 Dec 2020 01:15)  206 (04 Dec 2020 23:37)      POCT Blood Glucose.: 348 mg/dL (05 Dec 2020 11:53)  POCT Blood Glucose.: 359 mg/dL (05 Dec 2020 08:44)        RADIOLOGY & ADDITIONAL TESTS:  Results Reviewed:   Imaging Personally Reviewed:  Electrocardiogram Personally Reviewed:

## 2020-12-05 NOTE — CONSULT NOTE ADULT - SUBJECTIVE AND OBJECTIVE BOX
INFECTIOUS DISEASES AND INTERNAL MEDICINE at [place]  =======================================================  Chino Patel MD  Diplomates American Board of Internal Medicine and Infectious Diseases  Telephone [phone]  Fax            [phone]  =======================================================    VIRGIL [id number]      HPI:  70 yr old female with DM II on insulin, HTN, RA, HLD, eye cancer (s/p resection earlier this year along with associated LN in the left side of neck) who presents with acute hypoxemic respiratory failure  . PT REPORTS SHE WAS TESTED ON  AND WAS POSITIVE BUT WAS SICK BEFORE THAT  Patient with progressive symptoms of SOB and cough that brought her to the ED. She was found to be hypoxic into the 88-90% even on 4 LPM of O2 via NC.  . Denies chest pain, denies abdominal pain, constipation, diarrhea. Endorses cough, non productive and present on deep breath with exertional and at rest SOB. Endorses fever chills at home.    PT WITH COVID 19  ASKED TO EVALUATE FROM ID STANDPOINT       PAST MEDICAL & SURGICAL HISTORY:  HLD (hyperlipidemia)    H/O eye disorder  left ocular ca    Arthritis  RA    Thyroid nodule  bx confirmed benign    HTN (hypertension)    Morbid obesity    Diabetes  poorly controlled    H/O eye surgery  left eye - CA - mild loss of vision    S/P  section  x2    S/P hysterectomy    S/P cholecystectomy        ANTIBIOTICS  remdesivir  IVPB   IV Intermittent       Allergies    apple (Hives; Angioedema)  IV Contrast (Hives)  penicillin (Hives)  shellfish (Swelling)    Intolerances        SOCIAL HISTORY:     FAMILY HX   FAMILY HISTORY:  Family history of cerebral hemorrhage (Father)        Vital Signs Last 24 Hrs  T(C): 36.9 (05 Dec 2020 06:08), Max: 37.3 (04 Dec 2020 18:13)  T(F): 98.4 (05 Dec 2020 06:08), Max: 99.1 (04 Dec 2020 18:13)  HR: 92 (05 Dec 2020 06:08) (75 - 92)  BP: 129/68 (05 Dec 2020 06:08) (129/68 - 165/111)  BP(mean): --  RR: 22 (05 Dec 2020 06:08) (22 - 34)  SpO2: 92% (05 Dec 2020 06:08) (91% - 96%)  Drug Dosing Weight  Height (cm): 162.6 (04 Dec 2020 18:)  Weight (kg): 90.7 (04 Dec 2020 18:13)  BMI (kg/m2): 34.3 (04 Dec 2020 18:13)  BSA (m2): 1.96 (04 Dec 2020 18:)      REVIEW OF SYSTEMS:    CONSTITUTIONAL:  As per HPI.    HEENT:  Eyes:  No diplopia or blurred vision. ENT:  No earache, sore throat or runny nose.    CARDIOVASCULAR:  No pressure, squeezing, strangling, tightness, heaviness or aching about the chest, neck, axilla or epigastrium.    RESPIRATORY:  No cough, shortness of breath, PND or orthopnea.    GASTROINTESTINAL:  No nausea, vomiting or diarrhea.    GENITOURINARY:  No dysuria, frequency or urgency.    MUSCULOSKELETAL:  As per HPI.    SKIN:  No change in skin, hair or nails.    NEUROLOGIC:  No paresthesias, fasciculations, seizures or weakness.                  PHYSICAL EXAMINATION:    GENERAL: The patient is a well-developed, well-nourished _____in no apparent distress. ___ is alert and oriented x3.    VITAL SIGNS: T(C): 36.9 (20 @ 06:08), Max: 37.3 (20 @ 18:13)  HR: 92 (20 @ 06:08) (75 - 92)  BP: 129/68 (20 @ 06:08) (129/68 - 165/111)  RR: 22 (20 @ 06:08) (22 - 34)  SpO2: 92% (20 @ 06:08) (91% - 96%)  Wt(kg): --    HEENT: Head is normocephalic and atraumatic.  ANICTERIC  NECK: Supple. No carotid bruits.  No lymphadenopathy or thyromegaly.    LUNGS:COARSE BREATH SOUNDS    HEART: Regular rate and rhythm without murmur.    ABDOMEN: Soft, nontender, and nondistended.  Positive bowel sounds.  No hepatosplenomegaly was noted. NO REBOUND NO GUARDING    EXTREMITIES: NO EDEMA NO ERYTHEMA    NEUROLOGIC: NON FOCAL      SKIN: No ulceration or induration present. NO RASH        BLOOD CULTURES       URINE CX          LABS:                        13.3   10.14 )-----------( 255      ( 05 Dec 2020 02:16 )             41.6     12-    132<L>  |  96<L>  |  41.0<H>  ----------------------------<  278<H>  4.9   |  25.0  |  1.29    Ca    9.0      05 Dec 2020 02:17    TPro  7.6  /  Alb  3.0<L>  /  TBili  0.5  /  DBili  x   /  AST  61<H>  /  ALT  50<H>  /  AlkPhos  100  12-05    PT/INR - ( 04 Dec 2020 21:00 )   PT: 12.2 sec;   INR: 1.05 ratio         PTT - ( 04 Dec 2020 21:00 )  PTT:28.0 sec  Urinalysis Basic - ( 04 Dec 2020 23:56 )    Color: Yellow / Appearance: Clear / S.020 / pH: x  Gluc: x / Ketone: Negative  / Bili: Negative / Urobili: Negative mg/dL   Blood: x / Protein: 100 mg/dL / Nitrite: Negative   Leuk Esterase: Negative / RBC: 0-2 /HPF / WBC 0-2   Sq Epi: x / Non Sq Epi: Occasional / Bacteria: Many        RADIOLOGY & ADDITIONAL STUDIES:      ASSESSMENT/PLAN  70 yr old female with DM II on insulin, HTN, RA, HLD, eye cancer (s/p resection earlier this year along with associated LN in the left side of neck) who presents with acute hypoxemic respiratory failure  . PT REPORTS SHE WAS TESTED ON  AND WAS POSITIVE BUT WAS SICK BEFORE THAT  Patient with progressive symptoms of SOB and cough that brought her to the ED. She was found to be hypoxic into the 88-90% even on 4 LPM of O2 via NC.  . Denies chest pain, denies abdominal pain, constipation, diarrhea. Endorses cough, non productive and present on deep breath with exertional and at rest SOB. Endorses fever chills at home.    PT WITH COVID 19  PLACED ON STEROIDS AND REMDESIVIR LAST PM  PT CURRENTLY ON  NASAL CANULA   WILL RECOMMEND CONTINUE CURRENT REGIMEN WITH   BOTH DEXAMETHASONE AND REMDESIVIR  FOLLOWUP INFLAMMATORY MARKERS  RECOMMEND LIANNE KOWALSKI MD

## 2020-12-05 NOTE — PROGRESS NOTE ADULT - ASSESSMENT
70 yr old female with DM II on insulin, HTN, RA, HLD who presents with acute hypoxemic respiratory failure in the setting of positive COVID. now with acute hypoxic resp failure     #Acute hypoxic respiratory failure 2/2 COVID-19  - C/w Decadron  - C/w Remdisivir  - ID consulted, f/u recs.  - hold abx for now given low procal  - d dimer also low for now defer CTA  - lovenox BID  - Trend inflammatory q48h  - Supplemental oxygen prn, wean as tolerated.    #Hyperkalemia  - resolved, monitor BMP    #DM II  - diabetic diet  - lantus and premeal ordered  - FS TIDAC    #RA - not on dmard  - no acute exacerbation    #HTN  - continue toprol    #DVT ppx  - lovenox BID    Dispo/LOS anticipation: Pending clinical course

## 2020-12-05 NOTE — PATIENT PROFILE ADULT - LIVING ENVIRONMENT
How Severe Is It?: mild
Is This A New Presentation, Or A Follow-Up?: Follow Up Accutane
Females Only: When Was Your Last Menstrual Period?: 07/14/2018
no

## 2020-12-06 LAB
A1C WITH ESTIMATED AVERAGE GLUCOSE RESULT: 9.2 % — HIGH (ref 4–5.6)
ALBUMIN SERPL ELPH-MCNC: 2.9 G/DL — LOW (ref 3.3–5.2)
ALP SERPL-CCNC: 91 U/L — SIGNIFICANT CHANGE UP (ref 40–120)
ALT FLD-CCNC: 47 U/L — HIGH
ANION GAP SERPL CALC-SCNC: 11 MMOL/L — SIGNIFICANT CHANGE UP (ref 5–17)
AST SERPL-CCNC: 37 U/L — HIGH
BILIRUB SERPL-MCNC: 0.3 MG/DL — LOW (ref 0.4–2)
BUN SERPL-MCNC: 48 MG/DL — HIGH (ref 8–20)
CALCIUM SERPL-MCNC: 9.1 MG/DL — SIGNIFICANT CHANGE UP (ref 8.6–10.2)
CHLORIDE SERPL-SCNC: 101 MMOL/L — SIGNIFICANT CHANGE UP (ref 98–107)
CO2 SERPL-SCNC: 24 MMOL/L — SIGNIFICANT CHANGE UP (ref 22–29)
CREAT SERPL-MCNC: 1.11 MG/DL — SIGNIFICANT CHANGE UP (ref 0.5–1.3)
ESTIMATED AVERAGE GLUCOSE: 217 MG/DL — HIGH (ref 68–114)
GLUCOSE BLDC GLUCOMTR-MCNC: 221 MG/DL — HIGH (ref 70–99)
GLUCOSE BLDC GLUCOMTR-MCNC: 256 MG/DL — HIGH (ref 70–99)
GLUCOSE BLDC GLUCOMTR-MCNC: 257 MG/DL — HIGH (ref 70–99)
GLUCOSE BLDC GLUCOMTR-MCNC: 258 MG/DL — HIGH (ref 70–99)
GLUCOSE SERPL-MCNC: 255 MG/DL — HIGH (ref 70–99)
HCT VFR BLD CALC: 42.4 % — SIGNIFICANT CHANGE UP (ref 34.5–45)
HCV AB S/CO SERPL IA: 0.13 S/CO — SIGNIFICANT CHANGE UP (ref 0–0.99)
HCV AB SERPL-IMP: SIGNIFICANT CHANGE UP
HGB BLD-MCNC: 13.4 G/DL — SIGNIFICANT CHANGE UP (ref 11.5–15.5)
MCHC RBC-ENTMCNC: 26.6 PG — LOW (ref 27–34)
MCHC RBC-ENTMCNC: 31.6 GM/DL — LOW (ref 32–36)
MCV RBC AUTO: 84.3 FL — SIGNIFICANT CHANGE UP (ref 80–100)
PLATELET # BLD AUTO: 224 K/UL — SIGNIFICANT CHANGE UP (ref 150–400)
POTASSIUM SERPL-MCNC: 5 MMOL/L — SIGNIFICANT CHANGE UP (ref 3.5–5.3)
POTASSIUM SERPL-SCNC: 5 MMOL/L — SIGNIFICANT CHANGE UP (ref 3.5–5.3)
PROT SERPL-MCNC: 7.2 G/DL — SIGNIFICANT CHANGE UP (ref 6.6–8.7)
RBC # BLD: 5.03 M/UL — SIGNIFICANT CHANGE UP (ref 3.8–5.2)
RBC # FLD: 14.9 % — HIGH (ref 10.3–14.5)
SARS-COV-2 IGG SERPL QL IA: POSITIVE
SARS-COV-2 IGM SERPL IA-ACNC: 6.47 INDEX — HIGH
SODIUM SERPL-SCNC: 136 MMOL/L — SIGNIFICANT CHANGE UP (ref 135–145)
WBC # BLD: 7.12 K/UL — SIGNIFICANT CHANGE UP (ref 3.8–10.5)
WBC # FLD AUTO: 7.12 K/UL — SIGNIFICANT CHANGE UP (ref 3.8–10.5)

## 2020-12-06 PROCEDURE — 99233 SBSQ HOSP IP/OBS HIGH 50: CPT | Mod: CS

## 2020-12-06 PROCEDURE — 93010 ELECTROCARDIOGRAM REPORT: CPT | Mod: 76

## 2020-12-06 PROCEDURE — 99232 SBSQ HOSP IP/OBS MODERATE 35: CPT | Mod: CS

## 2020-12-06 RX ORDER — SODIUM CHLORIDE 9 MG/ML
1000 INJECTION INTRAMUSCULAR; INTRAVENOUS; SUBCUTANEOUS
Refills: 0 | Status: DISCONTINUED | OUTPATIENT
Start: 2020-12-06 | End: 2020-12-08

## 2020-12-06 RX ORDER — PSYLLIUM SEED (WITH DEXTROSE)
1 POWDER (GRAM) ORAL DAILY
Refills: 0 | Status: DISCONTINUED | OUTPATIENT
Start: 2020-12-06 | End: 2020-12-10

## 2020-12-06 RX ORDER — METOPROLOL TARTRATE 50 MG
50 TABLET ORAL ONCE
Refills: 0 | Status: COMPLETED | OUTPATIENT
Start: 2020-12-06 | End: 2020-12-06

## 2020-12-06 RX ORDER — METOPROLOL TARTRATE 50 MG
50 TABLET ORAL DAILY
Refills: 0 | Status: DISCONTINUED | OUTPATIENT
Start: 2020-12-07 | End: 2020-12-07

## 2020-12-06 RX ORDER — ALPRAZOLAM 0.25 MG
0.25 TABLET ORAL DAILY
Refills: 0 | Status: DISCONTINUED | OUTPATIENT
Start: 2020-12-06 | End: 2020-12-10

## 2020-12-06 RX ADMIN — Medication 3 UNIT(S): at 12:03

## 2020-12-06 RX ADMIN — Medication 3 UNIT(S): at 16:11

## 2020-12-06 RX ADMIN — Medication 600 MILLIGRAM(S): at 18:07

## 2020-12-06 RX ADMIN — Medication 50 MILLIGRAM(S): at 11:56

## 2020-12-06 RX ADMIN — Medication 6: at 12:02

## 2020-12-06 RX ADMIN — Medication 3 UNIT(S): at 08:48

## 2020-12-06 RX ADMIN — Medication 6 MILLIGRAM(S): at 07:04

## 2020-12-06 RX ADMIN — ENOXAPARIN SODIUM 40 MILLIGRAM(S): 100 INJECTION SUBCUTANEOUS at 00:18

## 2020-12-06 RX ADMIN — REMDESIVIR 500 MILLIGRAM(S): 5 INJECTION INTRAVENOUS at 01:12

## 2020-12-06 RX ADMIN — ENOXAPARIN SODIUM 40 MILLIGRAM(S): 100 INJECTION SUBCUTANEOUS at 12:02

## 2020-12-06 RX ADMIN — Medication 6: at 08:48

## 2020-12-06 RX ADMIN — Medication 1 PACKET(S): at 15:35

## 2020-12-06 RX ADMIN — Medication 600 MILLIGRAM(S): at 07:07

## 2020-12-06 RX ADMIN — Medication 6: at 16:11

## 2020-12-06 RX ADMIN — INSULIN GLARGINE 40 UNIT(S): 100 INJECTION, SOLUTION SUBCUTANEOUS at 23:07

## 2020-12-06 NOTE — PROGRESS NOTE ADULT - SUBJECTIVE AND OBJECTIVE BOX
INFECTIOUS DISEASES AND INTERNAL MEDICINE at [place]  =======================================================  Chino Patel MD  Diplomates American Board of Internal Medicine and Infectious Diseases  Telephone [phone]  Fax            [phone]  =======================================================    FREDERICK VIRGIL [id number]    Follow up:  COVID 19    Allergies:  apple (Hives; Angioedema)  IV Contrast (Hives)  penicillin (Hives)  shellfish (Swelling)      Medications:  acetaminophen   Tablet .. 650 milliGRAM(s) Oral every 6 hours PRN  ALBUTerol    90 MICROgram(s) HFA Inhaler 2 Puff(s) Inhalation every 4 hours PRN  ALPRAZolam 0.25 milliGRAM(s) Oral daily PRN  dexAMETHasone  Injectable 6 milliGRAM(s) IV Push daily  dextrose 40% Gel 15 Gram(s) Oral once  dextrose 5%. 1000 milliLiter(s) IV Continuous <Continuous>  dextrose 5%. 1000 milliLiter(s) IV Continuous <Continuous>  dextrose 50% Injectable 25 Gram(s) IV Push once  dextrose 50% Injectable 12.5 Gram(s) IV Push once  dextrose 50% Injectable 25 Gram(s) IV Push once  enoxaparin Injectable 40 milliGRAM(s) SubCutaneous every 12 hours  glucagon  Injectable 1 milliGRAM(s) IntraMuscular once  guaiFENesin  milliGRAM(s) Oral every 12 hours  insulin glargine Injectable (LANTUS) 40 Unit(s) SubCutaneous at bedtime  insulin lispro (ADMELOG) corrective regimen sliding scale   SubCutaneous three times a day before meals  insulin lispro (ADMELOG) corrective regimen sliding scale   SubCutaneous at bedtime  insulin lispro Injectable (ADMELOG) 3 Unit(s) SubCutaneous three times a day before meals  psyllium Powder 1 Packet(s) Oral daily  remdesivir  IVPB   IV Intermittent   remdesivir  IVPB 100 milliGRAM(s) IV Intermittent every 24 hours  sodium chloride 0.9%. 1000 milliLiter(s) IV Continuous <Continuous>    SOCIAL       FAMILY   FAMILY HISTORY:  Family history of cerebral hemorrhage (Father)      REVIEW OF SYSTEMS:  CONSTITUTIONAL:  No Fever or chills  HEENT:   No diplopia or blurred vision.  No earache, sore throat or runny nose.  CARDIOVASCULAR:  No pressure, squeezing, strangling, tightness, heaviness or aching about the chest, neck, axilla or epigastrium.  RESPIRATORY:   cough, shortness of breath,    GASTROINTESTINAL:  No nausea, vomiting or diarrhea.  GENITOURINARY:  No dysuria, frequency or urgency. No Blood in urine  MUSCULOSKELETAL:   moves all joints  SKIN:  No change in skin, hair or nails.  NEUROLOGIC:  No paresthesias, fasciculations, seizures or weakness.  PSYCHIATRIC:  No disorder of thought or mood.  ENDOCRINE:  No heat or cold intolerance, polyuria or polydipsia.  HEMATOLOGICAL:  No easy bruising or bleeding.            Physical Exam:  ICU Vital Signs Last 24 Hrs  T(C): 36.9 (06 Dec 2020 15:45), Max: 36.9 (05 Dec 2020 18:36)  T(F): 98.4 (06 Dec 2020 15:45), Max: 98.5 (05 Dec 2020 18:36)  HR: 75 (06 Dec 2020 15:45) (71 - 83)  BP: 146/83 (06 Dec 2020 15:45) (137/89 - 175/81)  BP(mean): 105 (05 Dec 2020 22:00) (105 - 112)  ABP: --  ABP(mean): --  RR: 29 (06 Dec 2020 15:45) (18 - 29)  SpO2: 95% (06 Dec 2020 15:45) (88% - 97%)    GEN: NAD,  ON  VENTI  MASK  HEENT: normocephalic and atraumatic. EOMI. NICKOLAS.    NECK: Supple. No carotid bruits.  No lymphadenopathy or thyromegaly.  LUNGS: Clear to auscultation.  HEART: Regular rate and rhythm without murmur.  ABDOMEN: Soft, nontender, and nondistended.  Positive bowel sounds.    : No CVA tenderness  EXTREMITIES: Without any cyanosis, clubbing, rash, lesions or edema.  MSK: no joint swelling  NEUROLOGIC: Cranial nerves II through XII are grossly intact.  PSYCHIATRIC: Appropriate affect .  SKIN: No ulceration or induration present.        Labs:  Vitals:  ============  T(F): 98.4 (06 Dec 2020 15:45), Max: 98.5 (05 Dec 2020 18:36)  HR: 75 (06 Dec 2020 15:45)  BP: 146/83 (06 Dec 2020 15:45)  RR: 29 (06 Dec 2020 15:45)  SpO2: 95% (06 Dec 2020 15:45) (88% - 97%)  temp max in last 48H T(F): , Max: 98.8 (12-05-20 @ 00:02)    =======================================================  Current Antibiotics:  remdesivir  IVPB   IV Intermittent   remdesivir  IVPB 100 milliGRAM(s) IV Intermittent every 24 hours    Other medications:  dexAMETHasone  Injectable 6 milliGRAM(s) IV Push daily  dextrose 40% Gel 15 Gram(s) Oral once  dextrose 5%. 1000 milliLiter(s) IV Continuous <Continuous>  dextrose 5%. 1000 milliLiter(s) IV Continuous <Continuous>  dextrose 50% Injectable 25 Gram(s) IV Push once  dextrose 50% Injectable 12.5 Gram(s) IV Push once  dextrose 50% Injectable 25 Gram(s) IV Push once  enoxaparin Injectable 40 milliGRAM(s) SubCutaneous every 12 hours  glucagon  Injectable 1 milliGRAM(s) IntraMuscular once  guaiFENesin  milliGRAM(s) Oral every 12 hours  insulin glargine Injectable (LANTUS) 40 Unit(s) SubCutaneous at bedtime  insulin lispro (ADMELOG) corrective regimen sliding scale   SubCutaneous three times a day before meals  insulin lispro (ADMELOG) corrective regimen sliding scale   SubCutaneous at bedtime  insulin lispro Injectable (ADMELOG) 3 Unit(s) SubCutaneous three times a day before meals  psyllium Powder 1 Packet(s) Oral daily  sodium chloride 0.9%. 1000 milliLiter(s) IV Continuous <Continuous>      =======================================================  Labs:                        13.4   7.12  )-----------( 224      ( 06 Dec 2020 07:15 )             42.4      12-06    136  |  101  |  48.0<H>  ----------------------------<  255<H>  5.0   |  24.0  |  1.11    Ca    9.1      06 Dec 2020 07:15    TPro  7.2  /  Alb  2.9<L>  /  TBili  0.3<L>  /  DBili  x   /  AST  37<H>  /  ALT  47<H>  /  AlkPhos  91  12-06      Creatinine, Serum: 1.11 mg/dL (12-06-20 @ 07:15)  Creatinine, Serum: 1.21 mg/dL (12-05-20 @ 23:08)  Creatinine, Serum: 1.29 mg/dL (12-05-20 @ 02:17)  Creatinine, Serum: 1.30 mg/dL (12-05-20 @ 02:16)  Creatinine, Serum: 1.21 mg/dL (12-04-20 @ 21:00)    Procalcitonin, Serum: 0.14 ng/mL (12-04-20 @ 21:00)    D-Dimer Assay, Quantitative: 349 ng/mL DDU (12-04-20 @ 21:00)    Ferritin, Serum: 677 ng/mL (12-04-20 @ 21:00)    C-Reactive Protein, Serum: 21.84 mg/dL (12-04-20 @ 21:00)    WBC Count: 7.12 K/uL (12-06-20 @ 07:15)  WBC Count: 10.14 K/uL (12-05-20 @ 02:16)  WBC Count: 11.05 K/uL (12-04-20 @ 21:00)      COVID-19 IgG Antibody Interpretation: Positive (12-05-20 @ 03:56)  COVID-19 IgG Antibody Index: 6.47 Index (12-05-20 @ 03:56)  COVID-19 PCR: Detected (12-04-20 @ 20:46)      Alkaline Phosphatase, Serum: 91 U/L (12-06-20 @ 07:15)  Alkaline Phosphatase, Serum: 100 U/L (12-05-20 @ 02:16)  Alkaline Phosphatase, Serum: 95 U/L (12-04-20 @ 21:00)  Alanine Aminotransferase (ALT/SGPT): 47 U/L (12-06-20 @ 07:15)  Alanine Aminotransferase (ALT/SGPT): 50 U/L (12-05-20 @ 02:16)  Alanine Aminotransferase (ALT/SGPT): 54 U/L (12-04-20 @ 21:00)  Aspartate Aminotransferase (AST/SGOT): 37 U/L (12-06-20 @ 07:15)  Aspartate Aminotransferase (AST/SGOT): 61 U/L (12-05-20 @ 02:16)  Aspartate Aminotransferase (AST/SGOT): 80 U/L (12-04-20 @ 21:00)  Bilirubin Total, Serum: 0.3 mg/dL (12-06-20 @ 07:15)  Bilirubin Total, Serum: 0.5 mg/dL (12-05-20 @ 02:16)  Bilirubin Total, Serum: 0.5 mg/dL (12-04-20 @ 21:00)

## 2020-12-06 NOTE — CHART NOTE - NSCHARTNOTEFT_GEN_A_CORE
Called by RN Re: patient c/o anxiety   Patient seen ~9:30 this morning  Patient RR 18-25, Spo2 97% on VM  patient reports that she is feeling anxious about covid and not being able to seen her family  will order 0.25mg xanax daily PRN for anxiety   RN and attending made Aware.  -Enzo Pascual PA-C

## 2020-12-06 NOTE — PROGRESS NOTE ADULT - ASSESSMENT
70 yr old female with DM II on insulin, HTN, RA, HLD, eye cancer (s/p resection earlier this year along with associated LN in the left side of neck) who presents with acute hypoxemic respiratory failure  . PT REPORTS SHE WAS TESTED ON 11/28 AND WAS POSITIVE BUT WAS SICK BEFORE THAT  Patient with progressive symptoms of SOB and cough that brought her to the ED. She was found to be hypoxic into the 88-90% even on 4 LPM of O2 via NC.  . Denies chest pain, denies abdominal pain, constipation, diarrhea. Endorses cough, non productive and present on deep breath with exertional and at rest SOB. Endorses fever chills at home.    PT WITH COVID 19  PLACED ON STEROIDS AND REMDESIVIR   PT CURRENTLY ON  VENTI MASK   WILL RECOMMEND CONTINUE CURRENT REGIMEN WITH   BOTH DEXAMETHASONE AND REMDESIVIR  FOLLOWUP INFLAMMATORY MARKERS  RECOMMEND PRONING  WILL FOLLOWUP  WITH FURTHER RECOMMENDATIONS

## 2020-12-06 NOTE — PROGRESS NOTE ADULT - ASSESSMENT
70 yr old female with DM II on insulin, HTN, RA, HLD who presents with acute hypoxemic respiratory failure in the setting of positive COVID. now with acute hypoxic resp failure     #Acute hypoxic respiratory failure 2/2 COVID-19  - C/w Decadron  - C/w Remdisivir  - ID following  - hold abx for now given low procal  - d dimer also low for now defer CTA  - lovenox BID  - Trend inflammatory q48h  - Supplemental oxygen prn, wean as tolerated. Currently on VM.     #PVCs  - Asymptomatic  - EKG  - Restart home Toprol    #Hyperkalemia  - resolved, monitor BMP    #DM II  - diabetic diet  - lantus and premeal ordered  - FS TIDAC    #RA - not on dmard  - no acute exacerbation    #HTN  - continue toprol    #Constipation  - Start Metamucil    #DVT ppx  - lovenox BID    Dispo/LOS anticipation: Pending clinical course, improvement of oxygenation.

## 2020-12-06 NOTE — PROGRESS NOTE ADULT - SUBJECTIVE AND OBJECTIVE BOX
Called for patient with hyponatremia, most recent sodium low.  12-05    131<L>  |  98  |  52.0<H>  ----------------------------<  291<H>  4.9   |  23.0  |  1.21    Ca    9.2      05 Dec 2020 23:08    TPro  7.6  /  Alb  3.0<L>  /  TBili  0.5  /  DBili  x   /  AST  61<H>  /  ALT  50<H>  /  AlkPhos  100  12-05    Patient is stable, no change in mental status    A/P hyponatremia- NS at 50 cc/hr x 12 hours.  Repeat BMP in the AM.

## 2020-12-06 NOTE — PROGRESS NOTE ADULT - SUBJECTIVE AND OBJECTIVE BOX
[facility] Division of Hospital Medicine  Samson Gomes [phone]    Chief Complaint:  Patient is a 70y old  Female who presents with a chief complaint of     SUBJECTIVE / OVERNIGHT EVENTS:  Patient seen and examined at bedside. Overnight pt had PVCs noted on tele, asymptomatic. Today she reports she feels better, no new complaints. Currently on VM.    MEDICATIONS  (STANDING):  dexAMETHasone  Injectable 6 milliGRAM(s) IV Push daily  dextrose 40% Gel 15 Gram(s) Oral once  dextrose 5%. 1000 milliLiter(s) (50 mL/Hr) IV Continuous <Continuous>  dextrose 5%. 1000 milliLiter(s) (100 mL/Hr) IV Continuous <Continuous>  dextrose 50% Injectable 25 Gram(s) IV Push once  dextrose 50% Injectable 12.5 Gram(s) IV Push once  dextrose 50% Injectable 25 Gram(s) IV Push once  enoxaparin Injectable 40 milliGRAM(s) SubCutaneous every 12 hours  glucagon  Injectable 1 milliGRAM(s) IntraMuscular once  guaiFENesin  milliGRAM(s) Oral every 12 hours  insulin glargine Injectable (LANTUS) 40 Unit(s) SubCutaneous at bedtime  insulin lispro (ADMELOG) corrective regimen sliding scale   SubCutaneous three times a day before meals  insulin lispro (ADMELOG) corrective regimen sliding scale   SubCutaneous at bedtime  insulin lispro Injectable (ADMELOG) 3 Unit(s) SubCutaneous three times a day before meals  remdesivir  IVPB   IV Intermittent     MEDICATIONS  (PRN):  acetaminophen   Tablet .. 650 milliGRAM(s) Oral every 6 hours PRN Temp greater or equal to 38C (100.4F), Mild Pain (1 - 3)  ALBUTerol    90 MICROgram(s) HFA Inhaler 2 Puff(s) Inhalation every 4 hours PRN Shortness of Breath and/or Wheezing        I&O's Summary      PHYSICAL EXAM:  Vital Signs Last 24 Hrs  T(C): 36.9 (05 Dec 2020 11:15), Max: 37.3 (04 Dec 2020 18:13)  T(F): 98.5 (05 Dec 2020 11:15), Max: 99.1 (04 Dec 2020 18:13)  HR: 65 (05 Dec 2020 11:15) (65 - 92)  BP: 154/78 (05 Dec 2020 11:15) (129/68 - 165/111)  BP(mean): --  RR: 20 (05 Dec 2020 11:15) (20 - 34)  SpO2: 91% (05 Dec 2020 11:15) (91% - 96%)        CONSTITUTIONAL: NAD, well-developed, well-groomed  HEENT: NC/AT, PERRL, no JVD  RESPIRATORY: CTA bilaterally, normal effort  CARDIOVASCULAR: RRR, S1/S2+, no m/g/r  ABDOMEN: Nontender to palpation, normoactive bowel sounds, no rebound/guarding; No hepatosplenomegaly  MUSCLOSKELETAL: No edema, cyanosis or deformities.  PSYCH: A+O to person, place, and time; affect appropriate  NEUROLOGY: CN 2-12 are intact and symmetric; no gross neurological deficits.  SKIN: No rashes; no palpable lesions  VASC: Distal pulses palpable    LABS:                        13.3   10.14 )-----------( 255      ( 05 Dec 2020 02:16 )             41.6     12-05    132<L>  |  96<L>  |  41.0<H>  ----------------------------<  278<H>  4.9   |  25.0  |  1.29    Ca    9.0      05 Dec 2020 02:17    TPro  7.6  /  Alb  3.0<L>  /  TBili  0.5  /  DBili  x   /  AST  61<H>  /  ALT  50<H>  /  AlkPhos  100  12-05    PT/INR - ( 04 Dec 2020 21:00 )   PT: 12.2 sec;   INR: 1.05 ratio         PTT - ( 04 Dec 2020 21:00 )  PTT:28.0 sec      Urinalysis Basic - ( 04 Dec 2020 23:56 )    Color: Yellow / Appearance: Clear / S.020 / pH: x  Gluc: x / Ketone: Negative  / Bili: Negative / Urobili: Negative mg/dL   Blood: x / Protein: 100 mg/dL / Nitrite: Negative   Leuk Esterase: Negative / RBC: 0-2 /HPF / WBC 0-2   Sq Epi: x / Non Sq Epi: Occasional / Bacteria: Many        CAPILLARY BLOOD GLUCOSE  237 (05 Dec 2020 01:15)  206 (04 Dec 2020 23:37)      POCT Blood Glucose.: 348 mg/dL (05 Dec 2020 11:53)  POCT Blood Glucose.: 359 mg/dL (05 Dec 2020 08:44)        RADIOLOGY & ADDITIONAL TESTS:  Results Reviewed:   Imaging Personally Reviewed:  Electrocardiogram Personally Reviewed:

## 2020-12-07 LAB
ALBUMIN SERPL ELPH-MCNC: 2.9 G/DL — LOW (ref 3.3–5.2)
ALP SERPL-CCNC: 80 U/L — SIGNIFICANT CHANGE UP (ref 40–120)
ALT FLD-CCNC: 45 U/L — HIGH
ANION GAP SERPL CALC-SCNC: 11 MMOL/L — SIGNIFICANT CHANGE UP (ref 5–17)
AST SERPL-CCNC: 37 U/L — HIGH
BILIRUB SERPL-MCNC: 0.3 MG/DL — LOW (ref 0.4–2)
BUN SERPL-MCNC: 42 MG/DL — HIGH (ref 8–20)
CALCIUM SERPL-MCNC: 9.1 MG/DL — SIGNIFICANT CHANGE UP (ref 8.6–10.2)
CHLORIDE SERPL-SCNC: 105 MMOL/L — SIGNIFICANT CHANGE UP (ref 98–107)
CO2 SERPL-SCNC: 24 MMOL/L — SIGNIFICANT CHANGE UP (ref 22–29)
CREAT SERPL-MCNC: 0.96 MG/DL — SIGNIFICANT CHANGE UP (ref 0.5–1.3)
CRP SERPL-MCNC: 5.36 MG/DL — HIGH (ref 0–0.4)
D DIMER BLD IA.RAPID-MCNC: 297 NG/ML DDU — HIGH
FERRITIN SERPL-MCNC: 627 NG/ML — HIGH (ref 15–150)
GLUCOSE BLDC GLUCOMTR-MCNC: 182 MG/DL — HIGH (ref 70–99)
GLUCOSE BLDC GLUCOMTR-MCNC: 202 MG/DL — HIGH (ref 70–99)
GLUCOSE BLDC GLUCOMTR-MCNC: 214 MG/DL — HIGH (ref 70–99)
GLUCOSE BLDC GLUCOMTR-MCNC: 221 MG/DL — HIGH (ref 70–99)
GLUCOSE BLDC GLUCOMTR-MCNC: 237 MG/DL — HIGH (ref 70–99)
GLUCOSE BLDC GLUCOMTR-MCNC: 271 MG/DL — HIGH (ref 70–99)
GLUCOSE SERPL-MCNC: 219 MG/DL — HIGH (ref 70–99)
HCT VFR BLD CALC: 42.9 % — SIGNIFICANT CHANGE UP (ref 34.5–45)
HGB BLD-MCNC: 13.3 G/DL — SIGNIFICANT CHANGE UP (ref 11.5–15.5)
LDH SERPL L TO P-CCNC: 242 U/L — HIGH (ref 98–192)
MAGNESIUM SERPL-MCNC: 2.1 MG/DL — SIGNIFICANT CHANGE UP (ref 1.8–2.6)
MCHC RBC-ENTMCNC: 26.3 PG — LOW (ref 27–34)
MCHC RBC-ENTMCNC: 31 GM/DL — LOW (ref 32–36)
MCV RBC AUTO: 85 FL — SIGNIFICANT CHANGE UP (ref 80–100)
PHOSPHATE SERPL-MCNC: 3.2 MG/DL — SIGNIFICANT CHANGE UP (ref 2.4–4.7)
PLATELET # BLD AUTO: 287 K/UL — SIGNIFICANT CHANGE UP (ref 150–400)
POTASSIUM SERPL-MCNC: 4.5 MMOL/L — SIGNIFICANT CHANGE UP (ref 3.5–5.3)
POTASSIUM SERPL-SCNC: 4.5 MMOL/L — SIGNIFICANT CHANGE UP (ref 3.5–5.3)
PROCALCITONIN SERPL-MCNC: 0.08 NG/ML — SIGNIFICANT CHANGE UP (ref 0.02–0.1)
PROT SERPL-MCNC: 7 G/DL — SIGNIFICANT CHANGE UP (ref 6.6–8.7)
RBC # BLD: 5.05 M/UL — SIGNIFICANT CHANGE UP (ref 3.8–5.2)
RBC # FLD: 15 % — HIGH (ref 10.3–14.5)
SODIUM SERPL-SCNC: 140 MMOL/L — SIGNIFICANT CHANGE UP (ref 135–145)
TROPONIN T SERPL-MCNC: <0.01 NG/ML — SIGNIFICANT CHANGE UP (ref 0–0.06)
WBC # BLD: 7.74 K/UL — SIGNIFICANT CHANGE UP (ref 3.8–10.5)
WBC # FLD AUTO: 7.74 K/UL — SIGNIFICANT CHANGE UP (ref 3.8–10.5)

## 2020-12-07 PROCEDURE — 93010 ELECTROCARDIOGRAM REPORT: CPT

## 2020-12-07 PROCEDURE — 99223 1ST HOSP IP/OBS HIGH 75: CPT

## 2020-12-07 PROCEDURE — 99232 SBSQ HOSP IP/OBS MODERATE 35: CPT | Mod: CS

## 2020-12-07 PROCEDURE — 99233 SBSQ HOSP IP/OBS HIGH 50: CPT | Mod: CS

## 2020-12-07 RX ORDER — SENNA PLUS 8.6 MG/1
2 TABLET ORAL AT BEDTIME
Refills: 0 | Status: DISCONTINUED | OUTPATIENT
Start: 2020-12-07 | End: 2020-12-10

## 2020-12-07 RX ORDER — HYDRALAZINE HCL 50 MG
10 TABLET ORAL EVERY 6 HOURS
Refills: 0 | Status: DISCONTINUED | OUTPATIENT
Start: 2020-12-07 | End: 2020-12-10

## 2020-12-07 RX ORDER — POLYETHYLENE GLYCOL 3350 17 G/17G
17 POWDER, FOR SOLUTION ORAL DAILY
Refills: 0 | Status: DISCONTINUED | OUTPATIENT
Start: 2020-12-07 | End: 2020-12-10

## 2020-12-07 RX ORDER — AMLODIPINE BESYLATE 2.5 MG/1
5 TABLET ORAL DAILY
Refills: 0 | Status: DISCONTINUED | OUTPATIENT
Start: 2020-12-07 | End: 2020-12-07

## 2020-12-07 RX ORDER — MAGNESIUM OXIDE 400 MG ORAL TABLET 241.3 MG
400 TABLET ORAL DAILY
Refills: 0 | Status: DISCONTINUED | OUTPATIENT
Start: 2020-12-07 | End: 2020-12-10

## 2020-12-07 RX ADMIN — Medication 4: at 08:26

## 2020-12-07 RX ADMIN — Medication 0: at 22:09

## 2020-12-07 RX ADMIN — Medication 6 MILLIGRAM(S): at 05:32

## 2020-12-07 RX ADMIN — REMDESIVIR 500 MILLIGRAM(S): 5 INJECTION INTRAVENOUS at 02:00

## 2020-12-07 RX ADMIN — INSULIN GLARGINE 40 UNIT(S): 100 INJECTION, SOLUTION SUBCUTANEOUS at 22:09

## 2020-12-07 RX ADMIN — POLYETHYLENE GLYCOL 3350 17 GRAM(S): 17 POWDER, FOR SOLUTION ORAL at 14:35

## 2020-12-07 RX ADMIN — ENOXAPARIN SODIUM 40 MILLIGRAM(S): 100 INJECTION SUBCUTANEOUS at 11:47

## 2020-12-07 RX ADMIN — Medication 3 UNIT(S): at 11:45

## 2020-12-07 RX ADMIN — Medication 50 MILLIGRAM(S): at 05:31

## 2020-12-07 RX ADMIN — Medication 600 MILLIGRAM(S): at 05:31

## 2020-12-07 RX ADMIN — SENNA PLUS 2 TABLET(S): 8.6 TABLET ORAL at 22:09

## 2020-12-07 RX ADMIN — Medication 6: at 17:03

## 2020-12-07 RX ADMIN — Medication 4: at 11:46

## 2020-12-07 RX ADMIN — Medication 600 MILLIGRAM(S): at 17:04

## 2020-12-07 RX ADMIN — Medication 3 UNIT(S): at 17:03

## 2020-12-07 RX ADMIN — ENOXAPARIN SODIUM 40 MILLIGRAM(S): 100 INJECTION SUBCUTANEOUS at 01:27

## 2020-12-07 RX ADMIN — Medication 1 PACKET(S): at 11:47

## 2020-12-07 RX ADMIN — Medication 3 UNIT(S): at 08:25

## 2020-12-07 NOTE — PROGRESS NOTE ADULT - SUBJECTIVE AND OBJECTIVE BOX
[facility] Division of Hospital Medicine  Samson Gomes [phone]    Chief Complaint:  Patient is a 70y old  Female who presents with a chief complaint of       Patient seen and examined at bedside.   Overnight/early this am around 7am pt noted with asymptomatic bradycardia to low 40s. Also tele w/ PVCS. EKG ordered, NSR w/ PVCS, rates 78, +prolonged QTC (490), bradycardia not captured on EKG  Patient states feeling well, complains of constipation  Eating breakfast and speaking in full sentences  Asks when she can go home  ROS negative - denies headache, dizziness, n/v, fever, chills, chest pain, SOB  VSS on VM & NC    Vital Signs Last 24 Hrs  T(C): 36.4 (07 Dec 2020 05:00), Max: 36.9 (06 Dec 2020 15:45)  T(F): 97.5 (07 Dec 2020 05:00), Max: 98.4 (06 Dec 2020 15:45)  HR: 84 (07 Dec 2020 05:00) (75 - 84)  BP: 151/102 (07 Dec 2020 05:00) (143/80 - 151/102)  BP(mean): 101 (06 Dec 2020 20:19) (101 - 101)  RR: 22 (07 Dec 2020 05:00) (22 - 29)  SpO2: 97% (07 Dec 2020 05:00) (95% - 97%) on VM and NC    PHYSICAL EXAM:  CONSTITUTIONAL: NAD, speaking in full sentences and eating breakfast  HEENT: NC/AT, PERRL, no JVD  RESPIRATORY: CTA bilaterally, normal effort  CARDIOVASCULAR: RRR, S1/S2+, no m/g/r  ABDOMEN: Nontender to palpation, normoactive bowel sounds, no rebound/guarding; No hepatosplenomegaly  MUSCLOSKELETAL: No edema, cyanosis or deformities.  PSYCH: A+O to person, place, and time; affect appropriate  NEUROLOGY: CN 2-12 are intact and symmetric; no gross neurological deficits.      LABS/IMAGING: REVIEWED

## 2020-12-07 NOTE — CONSULT NOTE ADULT - ATTENDING COMMENTS
bradycardia due to Remdesevir.   evaluate for sleep apnea.  Bigemin. Mag > 2 and potassium > 4.   PVcs.

## 2020-12-07 NOTE — CONSULT NOTE ADULT - ASSESSMENT
bradycardia  - metoprolol d/c'd  - cont telemetry monitoring  - cont Remdesivir   - Tele SB overnight    full note to follow 70 yr old female with DM II on insulin, HTN, RA, HLD, eye cancer (s/p resection earlier this year along with associated LN in the left side of neck) who presents with acute hypoxemic respiratory failure in the setting of positive COVID from 11/30. Patient with progressive symptoms of SOB and cough that brought her to the ED. She was found to be hypoxic into the 88-90% even on 4 LPM of O2 via NC. PAtient able to speak in partial sentences. Denies chest pain, denies abdominal pain, constipation, diarrhea. Endorses cough, non productive and present on deep breath with exertional and at rest SOB. Endorses fever chills at home. (04 Dec 2020 22:34)    bradycardia  - metoprolol d/c'd  - cont telemetry monitoring  - cont Remdesivir   - Tele SB overnight  - obtaining records from out pt cardiologist Dr. Bhavin Granados Sartell     Prolonged Qtc  - EKG with multiple PVCs  - likely overread  - repeat EKG tomorrow 70 yr old female with DM II on insulin, HTN, RA, HLD, eye cancer (s/p resection earlier this year along with associated LN in the left side of neck) who presents with acute hypoxemic respiratory failure in the setting of positive COVID from 11/30. Patient with progressive symptoms of SOB and cough that brought her to the ED. She was found to be hypoxic into the 88-90% even on 4 LPM of O2 via NC. PAtient able to speak in partial sentences. Denies chest pain, denies abdominal pain, constipation, diarrhea. Endorses cough, non productive and present on deep breath with exertional and at rest SOB. Endorses fever chills at home. (04 Dec 2020 22:34)    bradycardia  - metoprolol d/c'd  - cont telemetry monitoring  - cont Remdesivir   - Tele SB overnight  - obtaining records from out pt cardiologist Dr. Bhavin Granados Clarks       Prolonged Qtc  - EKG with multiple PVCs  - likely overread  - repeat EKG tomorrow

## 2020-12-07 NOTE — PROGRESS NOTE ADULT - SUBJECTIVE AND OBJECTIVE BOX
INFECTIOUS DISEASES AND INTERNAL MEDICINE at [facility]  =======================================================  Chino Patel MD  Diplomates American Board of Internal Medicine and Infectious Diseases  Telephone [phone]  Fax            [phone]  =======================================================    [place] [id number]    Follow up:  COVID 19    Allergies:  apple (Hives; Angioedema)  IV Contrast (Hives)  penicillin (Hives)  shellfish (Swelling)      Medications:  acetaminophen   Tablet .. 650 milliGRAM(s) Oral every 6 hours PRN  ALBUTerol    90 MICROgram(s) HFA Inhaler 2 Puff(s) Inhalation every 4 hours PRN  ALPRAZolam 0.25 milliGRAM(s) Oral daily PRN  dexAMETHasone  Injectable 6 milliGRAM(s) IV Push daily  dextrose 40% Gel 15 Gram(s) Oral once  dextrose 5%. 1000 milliLiter(s) IV Continuous <Continuous>  dextrose 5%. 1000 milliLiter(s) IV Continuous <Continuous>  dextrose 50% Injectable 25 Gram(s) IV Push once  dextrose 50% Injectable 12.5 Gram(s) IV Push once  dextrose 50% Injectable 25 Gram(s) IV Push once  enoxaparin Injectable 40 milliGRAM(s) SubCutaneous every 12 hours  glucagon  Injectable 1 milliGRAM(s) IntraMuscular once  guaiFENesin  milliGRAM(s) Oral every 12 hours  insulin glargine Injectable (LANTUS) 40 Unit(s) SubCutaneous at bedtime  insulin lispro (ADMELOG) corrective regimen sliding scale   SubCutaneous three times a day before meals  insulin lispro (ADMELOG) corrective regimen sliding scale   SubCutaneous at bedtime  insulin lispro Injectable (ADMELOG) 3 Unit(s) SubCutaneous three times a day before meals  psyllium Powder 1 Packet(s) Oral daily  remdesivir  IVPB   IV Intermittent   remdesivir  IVPB 100 milliGRAM(s) IV Intermittent every 24 hours  sodium chloride 0.9%. 1000 milliLiter(s) IV Continuous <Continuous>    SOCIAL       FAMILY   FAMILY HISTORY:  Family history of cerebral hemorrhage (Father)      REVIEW OF SYSTEMS:  CONSTITUTIONAL:  No Fever or chills  HEENT:   No diplopia or blurred vision.  No earache, sore throat or runny nose.  CARDIOVASCULAR:  No pressure, squeezing, strangling, tightness, heaviness or aching about the chest, neck, axilla or epigastrium.  RESPIRATORY:   cough, shortness of breath,    GASTROINTESTINAL:  No nausea, vomiting or diarrhea.  GENITOURINARY:  No dysuria, frequency or urgency. No Blood in urine  MUSCULOSKELETAL:   moves all joints  SKIN:  No change in skin, hair or nails.  NEUROLOGIC:  No paresthesias, fasciculations, seizures or weakness.  PSYCHIATRIC:  No disorder of thought or mood.  ENDOCRINE:  No heat or cold intolerance, polyuria or polydipsia.  HEMATOLOGICAL:  No easy bruising or bleeding.            Physical Exam:   Vital Signs Last 24 Hrs  T(C): 36.4 (07 Dec 2020 16:35), Max: 36.4 (06 Dec 2020 20:19)  T(F): 97.6 (07 Dec 2020 16:35), Max: 97.6 (07 Dec 2020 09:05)  HR: 84 (07 Dec 2020 16:35) (74 - 84)  BP: 143/76 (07 Dec 2020 16:35) (143/76 - 159/81)  BP(mean): 101 (06 Dec 2020 20:19) (101 - 101)  RR: 18 (07 Dec 2020 16:35) (18 - 23)  SpO2: 97% (07 Dec 2020 16:35) (96% - 98%)    GEN: NAD,  ON  VENTI  MASK  HEENT: normocephalic and atraumatic. EOMI. NICKOLAS.    NECK: Supple. No carotid bruits.  No lymphadenopathy or thyromegaly.  LUNGS: Clear to auscultation.  HEART: Regular rate and rhythm without murmur.  ABDOMEN: Soft, nontender, and nondistended.  Positive bowel sounds.    : No CVA tenderness  EXTREMITIES: Without any cyanosis, clubbing, rash, lesions or edema.  MSK: no joint swelling  NEUROLOGIC: Cranial nerves II through XII are grossly intact.  PSYCHIATRIC: Appropriate affect .  SKIN: No ulceration or induration present.        Labs:  V     =======================================================  Current Antibiotics:  remdesivir  IVPB   IV Intermittent   remdesivir  IVPB 100 milliGRAM(s) IV Intermittent every 24 hours    Other medications:  dexAMETHasone  Injectable 6 milliGRAM(s) IV Push daily  dextrose 40% Gel 15 Gram(s) Oral once  dextrose 5%. 1000 milliLiter(s) IV Continuous <Continuous>  dextrose 5%. 1000 milliLiter(s) IV Continuous <Continuous>  dextrose 50% Injectable 25 Gram(s) IV Push once  dextrose 50% Injectable 12.5 Gram(s) IV Push once  dextrose 50% Injectable 25 Gram(s) IV Push once  enoxaparin Injectable 40 milliGRAM(s) SubCutaneous every 12 hours  glucagon  Injectable 1 milliGRAM(s) IntraMuscular once  guaiFENesin  milliGRAM(s) Oral every 12 hours  insulin glargine Injectable (LANTUS) 40 Unit(s) SubCutaneous at bedtime  insulin lispro (ADMELOG) corrective regimen sliding scale   SubCutaneous three times a day before meals  insulin lispro (ADMELOG) corrective regimen sliding scale   SubCutaneous at bedtime  insulin lispro Injectable (ADMELOG) 3 Unit(s) SubCutaneous three times a day before meals  psyllium Powder 1 Packet(s) Oral daily  sodium chloride 0.9%. 1000 milliLiter(s) IV Continuous <Continuous>      =======================================================  Labs:                          @ 0 @                       13.3   7.74  )-----------( 287      ( 07 Dec 2020 05:47 )             42.9   12-07    140  |  105  |  42.0<H>  ----------------------------<  219<H>  4.5   |  24.0  |  0.96    Ca    9.1      07 Dec 2020 05:47  Phos  3.2     12-07  Mg     2.1     12-07    TPro  7.0  /  Alb  2.9<L>  /  TBili  0.3<L>  /  DBili  x   /  AST  37<H>  /  ALT  45<H>  /  AlkPhos  80  12-07      C-Reactive Protein, Serum: 21.84 mg/dL (12-04-20 @ 21:00)    WBC Count: 7.12 K/uL (12-06-20 @ 07:15)  WBC Count: 10.14 K/uL (12-05-20 @ 02:16)  WBC Count: 11.05 K/uL (12-04-20 @ 21:00)      COVID-19 IgG Antibody Interpretation: Positive (12-05-20 @ 03:56)  COVID-19 IgG Antibody Index: 6.47 Index (12-05-20 @ 03:56)  COVID-19 PCR: Detected (12-04-20 @ 20:46)      Alkaline Phosphatase, Serum: 91 U/L (12-06-20 @ 07:15)  Alkaline Phosphatase, Serum: 100 U/L (12-05-20 @ 02:16)  Alkaline Phosphatase, Serum: 95 U/L (12-04-20 @ 21:00)  Alanine Aminotransferase (ALT/SGPT): 47 U/L (12-06-20 @ 07:15)  Alanine Aminotransferase (ALT/SGPT): 50 U/L (12-05-20 @ 02:16)  Alanine Aminotransferase (ALT/SGPT): 54 U/L (12-04-20 @ 21:00)  Aspartate Aminotransferase (AST/SGOT): 37 U/L (12-06-20 @ 07:15)  Aspartate Aminotransferase (AST/SGOT): 61 U/L (12-05-20 @ 02:16)  Aspartate Aminotransferase (AST/SGOT): 80 U/L (12-04-20 @ 21:00)  Bilirubin Total, Serum: 0.3 mg/dL (12-06-20 @ 07:15)  Bilirubin Total, Serum: 0.5 mg/dL (12-05-20 @ 02:16)  Bilirubin Total, Serum: 0.5 mg/dL (12-04-20 @ 21:00)

## 2020-12-07 NOTE — PROGRESS NOTE ADULT - ASSESSMENT
70 yr old female with DM II on insulin, HTN, RA, HLD who presents with acute hypoxemic respiratory failure in the setting of positive COVID.     1. Acute hypoxic respiratory failure 2/2 COVID-19  - C/w Decadron and Remdisivir   - Supplemental oxygen PRN, wean as tolerated. Currently on VM & NC  - D dimer also low, for now defer CTA, trend ddimer  - Inflammatory markers downtrending nicely, trend Q48  - ID following  - AC w/ lovenox BID    2. Sinus rhythm w/ PVCs with episode of asymptomatic bradycardia  - Early this am around 7, pt noted with shyanne on tele to 40s. Pt asymptomatic. EKG ordered, NSR w/ PVCS, rates 78, +prolonged QTC (490), bradycardia not captured on EKG. No further episodes of bradycardia.. Trop pending. BB and norvasc discontinued for time being.  - Cardio consulted   - Continue to monitor on tele     3. Hyperkalemia  - Resolved, monitor BMP    4. DM II  - Diabetic diet  - Lantus and premeal ordered  - FS TIDAC    5. RA - not on dmard  - No acute exacerbation    6. HTN  - Continue toprol    7. Constipation  - Miralax and senna ordered     #DVT ppx  - lovenox BID    Dispo/LOS anticipation: Pending clinical course, improvement of oxygenation.   70 yr old female with DM II on insulin, HTN, RA, HLD who presents with acute hypoxemic respiratory failure in the setting of positive COVID.     1. Acute hypoxic respiratory failure 2/2 COVID-19  - C/w Decadron and Remdisivir   - Supplemental oxygen PRN, wean as tolerated. Currently on VM & NC  - D dimer also low, for now defer CTA, trend ddimer  - Inflammatory markers downtrending nicely, trend Q48  - ID following  - AC w/ lovenox BID    2. Sinus rhythm w/ PVCs with episode of asymptomatic bradycardia  - Early this am around 7, pt noted with shyanne on tele to 40s. Pt asymptomatic. EKG ordered, NSR w/ PVCS, rates 78, +prolonged QTC (490), bradycardia not captured on EKG. No further episodes of bradycardia.. Trop pending. BB and norvasc discontinued for time being.  - Cardio consulted   - Continue to monitor on tele     3. Hyperkalemia  - Resolved, monitor BMP    4. DM II  - Diabetic diet  - Lantus and premeal ordered  - FS TIDAC    5. RA - not on dmard  - No acute exacerbation    6. HTN  - Hold Toprol    7. Constipation  - Miralax and senna ordered     #DVT ppx  - lovenox BID    Dispo/LOS anticipation: Pending clinical course, improvement of oxygenation.

## 2020-12-07 NOTE — CONSULT NOTE ADULT - SUBJECTIVE AND OBJECTIVE BOX
[facility]                                                               Office:  [address] [address]                                                              Telephone: [phone]. Fax:[phone]                                                                        CARDIOLOGY CONSULTATION NOTE       History obtained by: Patient and medical record   obtained: No    Chief complaint:    Patient is a 70y old  Female who presents with a chief complaint of SARS COVID 19 (06 Dec 2020 00:20)        HPI:  70 yr old female with DM II on insulin, HTN, RA, HLD, eye cancer (s/p resection earlier this year along with associated LN in the left side of neck) who presents with acute hypoxemic respiratory failure in the setting of positive COVID from . Patient with progressive symptoms of SOB and cough that brought her to the ED. She was found to be hypoxic into the 88-90% even on 4 LPM of O2 via NC. PAtient able to speak in partial sentences. Denies chest pain, denies abdominal pain, constipation, diarrhea. Endorses cough, non productive and present on deep breath with exertional and at rest SOB. Endorses fever chills at home. (04 Dec 2020 22:34)      REVIEW OF SYMPTOMS:     CONSTITUTIONAL: No fever, weight loss, or fatigue  ENMT:  No difficulty hearing, tinnitus, vertigo; No sinus or throat pain  NECK: No pain or stiffness  CARDIOVASCULAR: No chest pain, dyspnea, syncope, palpitations, dizziness, Orthopnea, Paroxsymal nocturnal dyspnea  RESPIRATORY: No Dyspnea on exertion, Shortness of breath, cough, wheezing  : No dysuria, no hematuria   GI: No dark color stool, no melena, no diarrhea, no constipation, no abdominal pain   NEURO: No headache, no dizziness, no slurred speech   MUSCULOSKELETAL: No joint pain or swelling; No muscle, back, or extremity pain  PSYCH: No agitation, no anxiety.    ALL OTHER REVIEW OF SYSTEMS ARE NEGATIVE.        ALLERGIES: Allergies  apple (Hives; Angioedema)  IV Contrast (Hives)  penicillin (Hives)  shellfish (Swelling)    Intolerances        PAST MEDICAL HISTORY  HLD (hyperlipidemia)  H/O eye disorder  Arthritis  Thyroid nodule  Hematuria  HTN (hypertension)  Morbid obesity  Diabetes        PAST SURGICAL HISTORY  H/O eye surgery  S/P  section  S/P hysterectomy  S/P cholecystectomy      FAMILY HISTORY:  Family history of cerebral hemorrhage (Father)        SOCIAL HISTORY:  Denies smoking/alcohol/drugs      CURRENT MEDICATIONS:  hydrALAZINE Injectable 10 milliGRAM(s) IV Push every 6 hours PRN    guaiFENesin ER   polyethylene glycol 3350  psyllium Powder  senna  dexAMETHasone  Injectable  dextrose 40% Gel  dextrose 5%.  dextrose 50% Injectable  enoxaparin Injectable  glucagon  Injectable  insulin glargine Injectable (LANTUS)  insulin lispro (ADMELOG) corrective regimen sliding scale  insulin lispro (ADMELOG) corrective regimen sliding scale  insulin lispro Injectable (ADMELOG)  remdesivir  IVPB  remdesivir  IVPB  sodium chloride 0.9%.        HOME MEDICATIONS:      Vital Signs Last 24 Hrs  T(C): 36.4 (07 Dec 2020 09:05), Max: 36.9 (06 Dec 2020 15:45)  T(F): 97.6 (07 Dec 2020 09:05), Max: 98.4 (06 Dec 2020 15:45)  HR: 79 (07 Dec 2020 09:05) (75 - 84)  BP: 159/81 (07 Dec 2020 09:05) (143/80 - 159/81)  BP(mean): 101 (06 Dec 2020 20:19) (101 - 101)  RR: 20 (07 Dec 2020 09:05) (20 - 29)  SpO2: 98% (07 Dec 2020 09:05) (95% - 98%)      PHYSICAL EXAM:  Constitutional: Comfortable . No acute distress.   HEENT: Atraumatic and normocephalic , neck is supple . no JVD. No carotid bruit. PEERL   CNS: A&Ox3. No focal deficits. EOMI. Cranial nerves II-IX are intact.   Respiratory: CTAB  Cardiovascular: S1S2 RRR. No murmur/rubs or gallop.  Gastrointestinal: Soft non-tender and non distended . +Bowel sounds. negative Herzog's sign.  Extremities: No edema.   Psychiatric: Calm . no agitation.  Skin: No skin rash/ulcers visualized to face, hands or feet.    Intake and output:    @ 07:01  -   @ 07:00  --------------------------------------------------------  IN: 500 mL / OUT: 0 mL / NET: 500 mL        LABS:                        13.3   7.74  )-----------( 287      ( 07 Dec 2020 05:47 )             42.9     12-    140  |  105  |  42.0<H>  ----------------------------<  219<H>  4.5   |  24.0  |  0.96    Ca    9.1      07 Dec 2020 05:47  Phos  3.2       Mg     2.1         TPro  7.0  /  Alb  2.9<L>  /  TBili  0.3<L>  /  DBili  x   /  AST  37<H>  /  ALT  45<H>  /  AlkPhos  80  12    CARDIAC MARKERS ( 07 Dec 2020 12:00 )  x     / <0.01 ng/mL / x     / x     / x          INTERPRETATION OF TELEMETRY: SR, SB low 45bpm, Bigeminy   ECG: SR, bigeminy prolonged QT    RADIOLOGY & ADDITIONAL STUDIES:    X-ray:  reviewed by me.   CT scan:   MRI:      [place]                                                               Office:  [address] [address]                                                              Telephone: [phone]. Fax:[phone]                                                                        CARDIOLOGY CONSULTATION NOTE       History obtained by: Patient and medical record   obtained: No    Chief complaint:    Patient is a 70y old  Female who presents with a chief complaint of SARS COVID 19 (06 Dec 2020 00:20)        HPI:  70 yr old female with DM II on insulin, HTN, RA, HLD, eye cancer (s/p resection earlier this year along with associated LN in the left side of neck) who presents with acute hypoxemic respiratory failure in the setting of positive COVID from . Patient with progressive symptoms of SOB and cough that brought her to the ED. She was found to be hypoxic into the 88-90% even on 4 LPM of O2 via NC. PAtient able to speak in partial sentences. Denies chest pain, denies abdominal pain, constipation, diarrhea. Endorses cough, non productive and present on deep breath with exertional and at rest SOB. Endorses fever chills at home. (04 Dec 2020 22:34)      REVIEW OF SYMPTOMS:     CONSTITUTIONAL: No fever, weight loss, or fatigue  ENMT:  No difficulty hearing, tinnitus, vertigo; No sinus or throat pain  NECK: No pain or stiffness  CARDIOVASCULAR: No chest pain, dyspnea, syncope, palpitations, dizziness, Orthopnea, Paroxsymal nocturnal dyspnea  RESPIRATORY: + cough + Dyspnea on exertion, + Shortness of breath, wheezing  : No dysuria, no hematuria   GI: No dark color stool, no melena, no diarrhea, no constipation, no abdominal pain   NEURO: No headache, no dizziness, no slurred speech   MUSCULOSKELETAL: No joint pain or swelling; No muscle, back, or extremity pain  PSYCH: No agitation, no anxiety.    ALL OTHER REVIEW OF SYSTEMS ARE NEGATIVE.        ALLERGIES: Allergies  apple (Hives; Angioedema)  IV Contrast (Hives)  penicillin (Hives)  shellfish (Swelling)  Intolerances      PAST MEDICAL HISTORY  HLD (hyperlipidemia)  H/O eye disorder  Arthritis  Thyroid nodule  Hematuria  HTN (hypertension)  Morbid obesity  Diabetes      PAST SURGICAL HISTORY  H/O eye surgery  S/P  section  S/P hysterectomy  S/P cholecystectomy      FAMILY HISTORY:  Family history of cerebral hemorrhage (Father)      SOCIAL HISTORY:  Denies smoking/alcohol/drugs      CURRENT MEDICATIONS:  hydrALAZINE Injectable 10 milliGRAM(s) IV Push every 6 hours PRN  guaiFENesin ER   polyethylene glycol 3350  psyllium Powder  senna  dexAMETHasone  Injectable  dextrose 40% Gel  dextrose 5%.  dextrose 50% Injectable  enoxaparin Injectable  glucagon  Injectable  insulin glargine Injectable (LANTUS)  insulin lispro (ADMELOG) corrective regimen sliding scale  insulin lispro (ADMELOG) corrective regimen sliding scale  insulin lispro Injectable (ADMELOG)  remdesivir  IVPB  remdesivir  IVPB  sodium chloride 0.9%.        HOME MEDICATIONS:        Vital Signs Last 24 Hrs  T(C): 36.4 (07 Dec 2020 09:05), Max: 36.9 (06 Dec 2020 15:45)  T(F): 97.6 (07 Dec 2020 09:05), Max: 98.4 (06 Dec 2020 15:45)  HR: 79 (07 Dec 2020 09:05) (75 - 84)  BP: 159/81 (07 Dec 2020 09:05) (143/80 - 159/81)  BP(mean): 101 (06 Dec 2020 20:19) (101 - 101)  RR: 20 (07 Dec 2020 09:05) (20 - 29)  SpO2: 98% (07 Dec 2020 09:05) (95% - 98%)      PHYSICAL EXAM:  Constitutional: Comfortable . No acute distress.   HEENT: Atraumatic and normocephalic , neck is supple . no JVD. No carotid bruit. PEERL   CNS: A&Ox3. No focal deficits. EOMI. Cranial nerves II-IX are intact.   Respiratory: diminished breath sounds b/l   Cardiovascular: S1S2 RRR. No murmur/rubs or gallop.  Gastrointestinal: Soft non-tender and non distended . +Bowel sounds. negative Herzog's sign.  Extremities: No edema.   Psychiatric: Calm . no agitation.  Skin: No skin rash/ulcers visualized to face, hands or feet.    Intake and output:    @ 07:01  -   @ 07:00  --------------------------------------------------------  IN: 500 mL / OUT: 0 mL / NET: 500 mL        LABS:                        13.3   7.74  )-----------( 287      ( 07 Dec 2020 05:47 )             42.9     12-    140  |  105  |  42.0<H>  ----------------------------<  219<H>  4.5   |  24.0  |  0.96    Ca    9.1      07 Dec 2020 05:47  Phos  3.2       Mg     2.1         TPro  7.0  /  Alb  2.9<L>  /  TBili  0.3<L>  /  DBili  x   /  AST  37<H>  /  ALT  45<H>  /  AlkPhos  80  12    CARDIAC MARKERS ( 07 Dec 2020 12:00 )  x     / <0.01 ng/mL / x     / x     / x          INTERPRETATION OF TELEMETRY: SR, SB low 45bpm, Bigeminy   ECG: SR, bigeminy prolonged QT    RADIOLOGY & ADDITIONAL STUDIES:    X-ray:  reviewed by me.   CT scan:   MRI:

## 2020-12-08 LAB
ALBUMIN SERPL ELPH-MCNC: 3 G/DL — LOW (ref 3.3–5.2)
ALP SERPL-CCNC: 79 U/L — SIGNIFICANT CHANGE UP (ref 40–120)
ALT FLD-CCNC: 65 U/L — HIGH
ANION GAP SERPL CALC-SCNC: 6 MMOL/L — SIGNIFICANT CHANGE UP (ref 5–17)
AST SERPL-CCNC: 56 U/L — HIGH
BILIRUB SERPL-MCNC: 0.4 MG/DL — SIGNIFICANT CHANGE UP (ref 0.4–2)
BUN SERPL-MCNC: 40 MG/DL — HIGH (ref 8–20)
CALCIUM SERPL-MCNC: 9.3 MG/DL — SIGNIFICANT CHANGE UP (ref 8.6–10.2)
CHLORIDE SERPL-SCNC: 106 MMOL/L — SIGNIFICANT CHANGE UP (ref 98–107)
CO2 SERPL-SCNC: 28 MMOL/L — SIGNIFICANT CHANGE UP (ref 22–29)
CREAT SERPL-MCNC: 1.03 MG/DL — SIGNIFICANT CHANGE UP (ref 0.5–1.3)
GLUCOSE BLDC GLUCOMTR-MCNC: 143 MG/DL — HIGH (ref 70–99)
GLUCOSE BLDC GLUCOMTR-MCNC: 189 MG/DL — HIGH (ref 70–99)
GLUCOSE BLDC GLUCOMTR-MCNC: 318 MG/DL — HIGH (ref 70–99)
GLUCOSE BLDC GLUCOMTR-MCNC: 90 MG/DL — SIGNIFICANT CHANGE UP (ref 70–99)
GLUCOSE SERPL-MCNC: 82 MG/DL — SIGNIFICANT CHANGE UP (ref 70–99)
HCT VFR BLD CALC: 43.1 % — SIGNIFICANT CHANGE UP (ref 34.5–45)
HGB BLD-MCNC: 13 G/DL — SIGNIFICANT CHANGE UP (ref 11.5–15.5)
MCHC RBC-ENTMCNC: 26 PG — LOW (ref 27–34)
MCHC RBC-ENTMCNC: 30.2 GM/DL — LOW (ref 32–36)
MCV RBC AUTO: 86.2 FL — SIGNIFICANT CHANGE UP (ref 80–100)
PLATELET # BLD AUTO: 322 K/UL — SIGNIFICANT CHANGE UP (ref 150–400)
POTASSIUM SERPL-MCNC: 5.1 MMOL/L — SIGNIFICANT CHANGE UP (ref 3.5–5.3)
POTASSIUM SERPL-SCNC: 5.1 MMOL/L — SIGNIFICANT CHANGE UP (ref 3.5–5.3)
PROT SERPL-MCNC: 6.9 G/DL — SIGNIFICANT CHANGE UP (ref 6.6–8.7)
RBC # BLD: 5 M/UL — SIGNIFICANT CHANGE UP (ref 3.8–5.2)
RBC # FLD: 15.1 % — HIGH (ref 10.3–14.5)
SODIUM SERPL-SCNC: 140 MMOL/L — SIGNIFICANT CHANGE UP (ref 135–145)
TSH SERPL-MCNC: 1.06 UIU/ML — SIGNIFICANT CHANGE UP (ref 0.27–4.2)
WBC # BLD: 7.73 K/UL — SIGNIFICANT CHANGE UP (ref 3.8–10.5)
WBC # FLD AUTO: 7.73 K/UL — SIGNIFICANT CHANGE UP (ref 3.8–10.5)

## 2020-12-08 PROCEDURE — 99233 SBSQ HOSP IP/OBS HIGH 50: CPT

## 2020-12-08 PROCEDURE — 99233 SBSQ HOSP IP/OBS HIGH 50: CPT | Mod: CS

## 2020-12-08 PROCEDURE — 93010 ELECTROCARDIOGRAM REPORT: CPT

## 2020-12-08 RX ORDER — ZINC SULFATE TAB 220 MG (50 MG ZINC EQUIVALENT) 220 (50 ZN) MG
220 TAB ORAL DAILY
Refills: 0 | Status: DISCONTINUED | OUTPATIENT
Start: 2020-12-08 | End: 2020-12-10

## 2020-12-08 RX ORDER — ASCORBIC ACID 60 MG
1000 TABLET,CHEWABLE ORAL DAILY
Refills: 0 | Status: DISCONTINUED | OUTPATIENT
Start: 2020-12-08 | End: 2020-12-10

## 2020-12-08 RX ORDER — AMLODIPINE BESYLATE 2.5 MG/1
5 TABLET ORAL DAILY
Refills: 0 | Status: DISCONTINUED | OUTPATIENT
Start: 2020-12-08 | End: 2020-12-09

## 2020-12-08 RX ORDER — THIAMINE MONONITRATE (VIT B1) 100 MG
100 TABLET ORAL DAILY
Refills: 0 | Status: DISCONTINUED | OUTPATIENT
Start: 2020-12-08 | End: 2020-12-10

## 2020-12-08 RX ADMIN — ZINC SULFATE TAB 220 MG (50 MG ZINC EQUIVALENT) 220 MILLIGRAM(S): 220 (50 ZN) TAB at 11:44

## 2020-12-08 RX ADMIN — Medication 3 UNIT(S): at 17:24

## 2020-12-08 RX ADMIN — AMLODIPINE BESYLATE 5 MILLIGRAM(S): 2.5 TABLET ORAL at 17:24

## 2020-12-08 RX ADMIN — POLYETHYLENE GLYCOL 3350 17 GRAM(S): 17 POWDER, FOR SOLUTION ORAL at 11:35

## 2020-12-08 RX ADMIN — REMDESIVIR 500 MILLIGRAM(S): 5 INJECTION INTRAVENOUS at 02:18

## 2020-12-08 RX ADMIN — Medication 8: at 17:24

## 2020-12-08 RX ADMIN — Medication 3 UNIT(S): at 11:34

## 2020-12-08 RX ADMIN — Medication 1000 MILLIGRAM(S): at 11:44

## 2020-12-08 RX ADMIN — INSULIN GLARGINE 40 UNIT(S): 100 INJECTION, SOLUTION SUBCUTANEOUS at 22:44

## 2020-12-08 RX ADMIN — Medication 100 MILLIGRAM(S): at 11:44

## 2020-12-08 RX ADMIN — ENOXAPARIN SODIUM 40 MILLIGRAM(S): 100 INJECTION SUBCUTANEOUS at 02:18

## 2020-12-08 RX ADMIN — ENOXAPARIN SODIUM 40 MILLIGRAM(S): 100 INJECTION SUBCUTANEOUS at 22:44

## 2020-12-08 RX ADMIN — MAGNESIUM OXIDE 400 MG ORAL TABLET 400 MILLIGRAM(S): 241.3 TABLET ORAL at 11:35

## 2020-12-08 RX ADMIN — Medication 600 MILLIGRAM(S): at 05:59

## 2020-12-08 RX ADMIN — Medication 600 MILLIGRAM(S): at 17:25

## 2020-12-08 RX ADMIN — Medication 6 MILLIGRAM(S): at 05:59

## 2020-12-08 RX ADMIN — ENOXAPARIN SODIUM 40 MILLIGRAM(S): 100 INJECTION SUBCUTANEOUS at 11:35

## 2020-12-08 RX ADMIN — Medication 1 PACKET(S): at 11:35

## 2020-12-08 NOTE — PROGRESS NOTE ADULT - SUBJECTIVE AND OBJECTIVE BOX
[place] [facility]                                                               Office: [address] [address]                                                              Telephone: [phone]. Fax:[phone]                                                                             PROGRESS NOTE  Reason for follow up: Bradycardia   Overnight: No new events.   Update: Telemetry, bradycardia resolved. Noted to have frequent PVCs, Bigeminy, Trigeminy. As per RN, pt asymptomatic. Off Venti mask, no maintaining SpO2 on Nasal canula.     	  Vitals:  T(C): 36.3 (12-08-20 @ 09:18), Max: 36.4 (12-07-20 @ 16:35)  HR: 60 (12-08-20 @ 09:18) (60 - 84)  BP: 153/83 (12-08-20 @ 09:18) (143/76 - 155/61)  RR: 18 (12-08-20 @ 09:18) (18 - 18)  SpO2: 94% (12-08-20 @ 09:18) (88% - 97%)    Weight (kg): 90.7 (12-04 @ 18:13)    PHYSICAL Exam  Due to the nature of this patient's COVID-19 isolation status, no bedside physical exam done to limit spread of infection.  Examination highlights were provided by bedside nurse. Objective data/Lab results were reviewed in detail. Cont. medical therapy and treatment of underlying infection.    CURRENT MEDICATIONS:  hydrALAZINE Injectable 10 milliGRAM(s) IV Push every 6 hours PRN  guaiFENesin ER  remdesivir  IVPB  polyethylene glycol 3350  psyllium Powder  senna  dexAMETHasone  Injectable  dextrose 40% Gel  dextrose 50% Injectable  glucagon  Injectable  insulin glargine Injectable (LANTUS)  insulin lispro (ADMELOG) corrective regimen sliding scale  insulin lispro (ADMELOG) corrective regimen sliding scale  insulin lispro Injectable (ADMELOG)  ascorbic acid  dextrose 5%.  dextrose 5%.  enoxaparin Injectable  magnesium oxide  thiamine  zinc sulfate  	      LABS:	 	  CARDIAC MARKERS ( 07 Dec 2020 12:00 )  x     / <0.01 ng/mL / x     / x     / x      p-BNP 07 Dec 2020 12:00: x                              13.0   7.73  )-----------( 322      ( 08 Dec 2020 07:42 )             43.1     12-08    140  |  106  |  40.0<H>  ----------------------------<  82  5.1   |  28.0  |  1.03    Ca    9.3      08 Dec 2020 07:42  Phos  3.2     12-07  Mg     2.1     12-07    TPro  6.9  /  Alb  3.0<L>  /  TBili  0.4  /  DBili  x   /  AST  56<H>  /  ALT  65<H>  /  AlkPhos  79  12-08    TSH: Thyroid Stimulating Hormone, Serum: 1.06 uIU/mL    TELEMETRY: SR, Bigeminy, Trigeminy, PVCs

## 2020-12-08 NOTE — PROGRESS NOTE ADULT - ASSESSMENT
70 yr old female with DM II on insulin, HTN, RA, HLD, eye cancer (s/p resection earlier this year along with associated LN in the left side of neck) who presents with acute hypoxemic respiratory failure in the setting of positive COVID from 11/30. Patient with progressive symptoms of SOB and cough that brought her to the ED. She was found to be hypoxic into the 88-90% even on 4 LPM of O2 via NC. PAtient able to speak in partial sentences. Denies chest pain, denies abdominal pain, constipation, diarrhea. Endorses cough, non productive and present on deep breath with exertional and at rest SOB. Endorses fever chills at home. (04 Dec 2020 22:34)    bradycardia  - resolved  - cont Remdesivir   - Tele- SR, Bigeminy, Trigeminy   - F/u with Dr. Granados - out patient cardiologist     HTN  - resume Norvasc  - cont to hold Metoprolol for now      Preliminary evaluation, please await complete evaluation by Dr. Singh

## 2020-12-08 NOTE — PROGRESS NOTE ADULT - SUBJECTIVE AND OBJECTIVE BOX
[facility] Division of Hospital Medicine  Samson Gomes [phone]    Chief Complaint:  Patient is a 70y old  Female who presents with a chief complaint of     Patient seen and examined at bedside  Patient weaned from VM & NC to just 4L NC this morning, maintaining sats in mid 90s  Patient states feeling better, no specific complaints   ROS negative - denies headache, dizziness, n/v, fever, chills, chest pain, SOB  VSS on 4L NC    Vital Signs Last 24 Hrs  T(C): 36.3 (08 Dec 2020 09:18), Max: 36.4 (07 Dec 2020 16:35)  T(F): 97.4 (08 Dec 2020 09:18), Max: 97.6 (07 Dec 2020 16:35)  HR: 60 (08 Dec 2020 09:18) (60 - 84)  BP: 153/83 (08 Dec 2020 09:18) (143/76 - 155/61)  BP(mean): --  RR: 18 (08 Dec 2020 09:18) (18 - 18)  SpO2: 94% (08 Dec 2020 09:18) (88% - 97%) on 4L NC    PHYSICAL EXAM:  CONSTITUTIONAL: NAD, speaking in full sentences w/o difficulty  HEENT: NC/AT, PERRL, no JVD  RESPIRATORY: CTA bilaterally, normal effort  CARDIOVASCULAR: RRR, S1/S2+, no m/g/r  ABDOMEN: Nontender to palpation, normoactive bowel sounds, no rebound/guarding; No hepatosplenomegaly  MUSCLOSKELETAL: No edema, cyanosis or deformities.  PSYCH: A+O to person, place, and time; affect appropriate  NEUROLOGY: CN 2-12 are intact and symmetric; no gross neurological deficits.      LABS/IMAGING: REVIEWED

## 2020-12-08 NOTE — PROGRESS NOTE ADULT - ASSESSMENT
70 yr old female with DM II on insulin, HTN, RA, HLD who presents with acute hypoxemic respiratory failure in the setting of positive COVID.     1. Acute hypoxic respiratory failure 2/2 COVID-19  - C/w Decadron and Remdisivir   - O2 weaned from VM & NC to just 4L NC. Monitor on NC today, continue to wean as able. Improving overall  - D dimer low and w/ improvement in o2 status will defer CTA, trend ddimer  - Inflammatory markers downtrending nicely, trend Q48  - ID following  - AC w/ lovenox BID    2. Sinus rhythm w/ frequent PVCs and bigemeny w/ episode of asymptomatic bradycardia to the 30s  - Pt w/ known hx of bigeminy per report  - Seen by cardio, metoprolol & norvasc discontinued  - Continue to monitor on tele, still w/ bigeminy on tele. No further reported episodes of bradycardia    3. Hyperkalemia  - Resolved, monitor BMP    4. DM II  - Diabetic diet  - Lantus and premeal ordered  - FS TIDAC    5. RA - not on dmard  - No acute exacerbation    6. HTN  - Hold Toprol and Norvasc due to the above  - Follow cardio recs for BP meds..  - IV hydralazine ordered PRN for now    7. Constipation  - Miralax and senna ordered     #DVT ppx  - lovenox BID    Dispo/LOS anticipation: Pending clinical course, improvement of oxygenation. Now weaned to 4L NC.    70 yr old female with DM II on insulin, HTN, RA, HLD who presents with acute hypoxemic respiratory failure in the setting of positive COVID.     1. Acute hypoxic respiratory failure 2/2 COVID-19  - C/w Decadron and Remdisivir   - O2 weaned from VM & NC to just 4L NC. Monitor on NC today, continue to wean as able. Improving overall  - D dimer low and w/ improvement in o2 status will defer CTA, trend ddimer  - Inflammatory markers downtrending nicely, trend Q48  - ID following  - AC w/ lovenox BID    2. Sinus rhythm w/ frequent PVCs and bigemeny w/ episode of asymptomatic bradycardia to the 30s  - Pt w/ known hx of bigeminy per report  - Seen by cardio, metoprolol & norvasc discontinued  - Continue to monitor on tele, still w/ bigeminy on tele. No further reported episodes of bradycardia  - Repeat EKG ordered for this am, follow     3. Hyperkalemia  - Resolved, monitor BMP    4. DM II  - Diabetic diet  - Lantus and premeal ordered  - FS TIDAC    5. RA - not on dmard  - No acute exacerbation    6. HTN  - Hold Toprol and Norvasc due to the above  - Follow cardio recs for BP meds..  - IV hydralazine ordered PRN for now    7. Constipation  - Miralax and senna ordered     #DVT ppx  - lovenox BID    Dispo/LOS anticipation: Pending clinical course, improvement of oxygenation. Now weaned to 4L NC.

## 2020-12-09 LAB
ALBUMIN SERPL ELPH-MCNC: 2.7 G/DL — LOW (ref 3.3–5.2)
ALP SERPL-CCNC: 87 U/L — SIGNIFICANT CHANGE UP (ref 40–120)
ALT FLD-CCNC: 95 U/L — HIGH
ANION GAP SERPL CALC-SCNC: 7 MMOL/L — SIGNIFICANT CHANGE UP (ref 5–17)
AST SERPL-CCNC: 66 U/L — HIGH
BILIRUB SERPL-MCNC: 0.5 MG/DL — SIGNIFICANT CHANGE UP (ref 0.4–2)
BUN SERPL-MCNC: 37 MG/DL — HIGH (ref 8–20)
CALCIUM SERPL-MCNC: 9.1 MG/DL — SIGNIFICANT CHANGE UP (ref 8.6–10.2)
CHLORIDE SERPL-SCNC: 104 MMOL/L — SIGNIFICANT CHANGE UP (ref 98–107)
CO2 SERPL-SCNC: 26 MMOL/L — SIGNIFICANT CHANGE UP (ref 22–29)
CREAT SERPL-MCNC: 0.99 MG/DL — SIGNIFICANT CHANGE UP (ref 0.5–1.3)
CRP SERPL-MCNC: 1.71 MG/DL — HIGH (ref 0–0.4)
GLUCOSE BLDC GLUCOMTR-MCNC: 139 MG/DL — HIGH (ref 70–99)
GLUCOSE BLDC GLUCOMTR-MCNC: 154 MG/DL — HIGH (ref 70–99)
GLUCOSE BLDC GLUCOMTR-MCNC: 181 MG/DL — HIGH (ref 70–99)
GLUCOSE BLDC GLUCOMTR-MCNC: 91 MG/DL — SIGNIFICANT CHANGE UP (ref 70–99)
GLUCOSE SERPL-MCNC: 90 MG/DL — SIGNIFICANT CHANGE UP (ref 70–99)
HCT VFR BLD CALC: 42.8 % — SIGNIFICANT CHANGE UP (ref 34.5–45)
HGB BLD-MCNC: 13.4 G/DL — SIGNIFICANT CHANGE UP (ref 11.5–15.5)
MCHC RBC-ENTMCNC: 26.8 PG — LOW (ref 27–34)
MCHC RBC-ENTMCNC: 31.3 GM/DL — LOW (ref 32–36)
MCV RBC AUTO: 85.6 FL — SIGNIFICANT CHANGE UP (ref 80–100)
PLATELET # BLD AUTO: 326 K/UL — SIGNIFICANT CHANGE UP (ref 150–400)
POTASSIUM SERPL-MCNC: 4.8 MMOL/L — SIGNIFICANT CHANGE UP (ref 3.5–5.3)
POTASSIUM SERPL-SCNC: 4.8 MMOL/L — SIGNIFICANT CHANGE UP (ref 3.5–5.3)
PROT SERPL-MCNC: 6.7 G/DL — SIGNIFICANT CHANGE UP (ref 6.6–8.7)
RBC # BLD: 5 M/UL — SIGNIFICANT CHANGE UP (ref 3.8–5.2)
RBC # FLD: 14.8 % — HIGH (ref 10.3–14.5)
SODIUM SERPL-SCNC: 137 MMOL/L — SIGNIFICANT CHANGE UP (ref 135–145)
WBC # BLD: 7.46 K/UL — SIGNIFICANT CHANGE UP (ref 3.8–10.5)
WBC # FLD AUTO: 7.46 K/UL — SIGNIFICANT CHANGE UP (ref 3.8–10.5)

## 2020-12-09 PROCEDURE — 99232 SBSQ HOSP IP/OBS MODERATE 35: CPT | Mod: CS

## 2020-12-09 RX ORDER — AMLODIPINE BESYLATE 2.5 MG/1
10 TABLET ORAL DAILY
Refills: 0 | Status: DISCONTINUED | OUTPATIENT
Start: 2020-12-09 | End: 2020-12-10

## 2020-12-09 RX ADMIN — ENOXAPARIN SODIUM 40 MILLIGRAM(S): 100 INJECTION SUBCUTANEOUS at 23:39

## 2020-12-09 RX ADMIN — Medication 2: at 17:14

## 2020-12-09 RX ADMIN — Medication 3 UNIT(S): at 11:36

## 2020-12-09 RX ADMIN — Medication 1000 MILLIGRAM(S): at 11:35

## 2020-12-09 RX ADMIN — ENOXAPARIN SODIUM 40 MILLIGRAM(S): 100 INJECTION SUBCUTANEOUS at 11:35

## 2020-12-09 RX ADMIN — MAGNESIUM OXIDE 400 MG ORAL TABLET 400 MILLIGRAM(S): 241.3 TABLET ORAL at 11:35

## 2020-12-09 RX ADMIN — INSULIN GLARGINE 40 UNIT(S): 100 INJECTION, SOLUTION SUBCUTANEOUS at 23:39

## 2020-12-09 RX ADMIN — AMLODIPINE BESYLATE 5 MILLIGRAM(S): 2.5 TABLET ORAL at 05:52

## 2020-12-09 RX ADMIN — ZINC SULFATE TAB 220 MG (50 MG ZINC EQUIVALENT) 220 MILLIGRAM(S): 220 (50 ZN) TAB at 11:35

## 2020-12-09 RX ADMIN — Medication 600 MILLIGRAM(S): at 17:14

## 2020-12-09 RX ADMIN — REMDESIVIR 500 MILLIGRAM(S): 5 INJECTION INTRAVENOUS at 01:40

## 2020-12-09 RX ADMIN — Medication 6 MILLIGRAM(S): at 05:52

## 2020-12-09 RX ADMIN — Medication 1 PACKET(S): at 11:35

## 2020-12-09 RX ADMIN — Medication 2: at 11:35

## 2020-12-09 RX ADMIN — Medication 3 UNIT(S): at 17:14

## 2020-12-09 RX ADMIN — Medication 100 MILLIGRAM(S): at 11:35

## 2020-12-09 RX ADMIN — Medication 600 MILLIGRAM(S): at 05:52

## 2020-12-09 NOTE — DIETITIAN INITIAL EVALUATION ADULT. - OTHER INFO
70 year old female with DM II on insulin, HTN, RA, HLD who presents with acute hypoxemic respiratory failure in the setting of positive COVID. Pt on COVID isolation precautions. Currently on a CCHO diet, eating well per documentation. Noted with 1+ generalized and 2+ b/l foot edema. Last BM 12/8. Skin intact. RD to follow up.

## 2020-12-09 NOTE — DIETITIAN INITIAL EVALUATION ADULT. - ADD RECOMMEND
Continue vitamin C supplementation, add MVI and vitamin D daily. Encourage po intake, monitor diet tolerance, and provide assistance at meals as needed. Obtain daily weights to monitor trends.

## 2020-12-09 NOTE — DIETITIAN INITIAL EVALUATION ADULT. - PERTINENT LABORATORY DATA
12-09 Na137 mmol/L Glu 90 mg/dL K+ 4.8 mmol/L Cr  0.99 mg/dL BUN 37.0 mg/dL<H> Phos n/a   Alb 2.7 g/dL<L> PAB n/a

## 2020-12-09 NOTE — DIETITIAN INITIAL EVALUATION ADULT. - PERTINENT MEDS FT
MEDICATIONS  (STANDING):  amLODIPine   Tablet 5 milliGRAM(s) Oral daily  ascorbic acid 1000 milliGRAM(s) Oral daily  dexAMETHasone  Injectable 6 milliGRAM(s) IV Push daily  dextrose 40% Gel 15 Gram(s) Oral once  dextrose 5%. 1000 milliLiter(s) (50 mL/Hr) IV Continuous <Continuous>  dextrose 5%. 1000 milliLiter(s) (100 mL/Hr) IV Continuous <Continuous>  dextrose 50% Injectable 25 Gram(s) IV Push once  dextrose 50% Injectable 12.5 Gram(s) IV Push once  dextrose 50% Injectable 25 Gram(s) IV Push once  enoxaparin Injectable 40 milliGRAM(s) SubCutaneous every 12 hours  glucagon  Injectable 1 milliGRAM(s) IntraMuscular once  guaiFENesin  milliGRAM(s) Oral every 12 hours  insulin glargine Injectable (LANTUS) 40 Unit(s) SubCutaneous at bedtime  insulin lispro (ADMELOG) corrective regimen sliding scale   SubCutaneous three times a day before meals  insulin lispro (ADMELOG) corrective regimen sliding scale   SubCutaneous at bedtime  insulin lispro Injectable (ADMELOG) 3 Unit(s) SubCutaneous three times a day before meals  magnesium oxide 400 milliGRAM(s) Oral daily  polyethylene glycol 3350 17 Gram(s) Oral daily  psyllium Powder 1 Packet(s) Oral daily  senna 2 Tablet(s) Oral at bedtime  thiamine 100 milliGRAM(s) Oral daily  zinc sulfate 220 milliGRAM(s) Oral daily    MEDICATIONS  (PRN):  acetaminophen   Tablet .. 650 milliGRAM(s) Oral every 6 hours PRN Temp greater or equal to 38C (100.4F), Mild Pain (1 - 3)  ALBUTerol    90 MICROgram(s) HFA Inhaler 2 Puff(s) Inhalation every 4 hours PRN Shortness of Breath and/or Wheezing  ALPRAZolam 0.25 milliGRAM(s) Oral daily PRN anxiety  hydrALAZINE Injectable 10 milliGRAM(s) IV Push every 6 hours PRN SBP > 160

## 2020-12-09 NOTE — PHYSICAL THERAPY INITIAL EVALUATION ADULT - ADDITIONAL COMMENTS
Pt lives with spouse(who is currently in the hospital at [facility]), daughter and 7 y.o grandson in a private home with 3 CASANDRA 1 handrail and bed&bath on ground level. Pt's PLOF was independent in all ADL's/ambulation with SAC. Pt has [facility] DME at home. At this time, RW and shower chair is recommended upon d/c.

## 2020-12-09 NOTE — PROGRESS NOTE ADULT - SUBJECTIVE AND OBJECTIVE BOX
[facility] Division of Hospital Medicine  Samson Gomes [phone]    Chief Complaint:  Patient is a 70y old  Female who presents with a chief complaint of     Patient seen and examined at bedside  Patient sitting up in bed, no complaints, states feeling much better today  Currently weaned to 2L NC, tolerating well   ROS negative - denies headache, dizziness, n/v, fever, chills, chest pain, SOB  VSS on 2L NC    Vital Signs Last 24 Hrs  T(C): 36.6 (09 Dec 2020 09:47), Max: 36.6 (09 Dec 2020 09:47)  T(F): 97.8 (09 Dec 2020 09:47), Max: 97.8 (09 Dec 2020 09:47)  HR: 75 (09 Dec 2020 09:47) (69 - 99)  BP: 157/69 (09 Dec 2020 09:47) (135/67 - 157/69)  BP(mean): --  RR: 18 (09 Dec 2020 09:47) (18 - 22)  SpO2: 93% (09 Dec 2020 09:47) (92% - 95%) on 2L NC    PHYSICAL EXAM:  CONSTITUTIONAL: NAD, speaking in full sentences w/o difficulty, now on 2 L sating well   HEENT: NC/AT, PERRL, no JVD  RESPIRATORY: CTA bilaterally, normal effort  CARDIOVASCULAR: RRR, S1/S2+, no m/g/r  ABDOMEN: Nontender to palpation, normoactive bowel sounds, no rebound/guarding; No hepatosplenomegaly  MUSCLOSKELETAL: No edema, cyanosis or deformities.  PSYCH: A+O to person, place, and time; affect appropriate  NEUROLOGY: CN 2-12 are intact and symmetric; no gross neurological deficits.      LABS/IMAGING: REVIEWED

## 2020-12-09 NOTE — PHYSICAL THERAPY INITIAL EVALUATION ADULT - GENERAL OBSERVATIONS, REHAB EVAL
Polyps were a mixture of benign, clinically meaningless hyperplastic polyps and/or pre-cancerous tubular adenomas.  No cancer cells were seen.  While the adenomas were removed in their entirety so that there is no risk of that particular tissue maturing into colon cancer, there is an increased probability of future polyp formation.  As such, I recommend a repeat colonoscopy for polyp surveillance in 3 years. -- Please update \"Health Maintenance\".    No PCP on file.      Pt received on 4BRK, ASHA montemayor'ed pt for PT. pt observed sitting upright in bed with 1.5 L O2 NC, telemonitor with , pleasant, cooperative, A&O and c/o 0/10 pain t/o eval

## 2020-12-09 NOTE — PROGRESS NOTE ADULT - ASSESSMENT
70 yr old female with DM II on insulin, HTN, RA, HLD who presents with acute hypoxemic respiratory failure in the setting of positive COVID.     1. Acute hypoxic respiratory failure 2/2 COVID-19  - Improving  - Currently weaned to 2L NC maintaining sats, doing well    - Completed 5 day course of remdesivir   - Continue dexamethasone (day 5)  - D dimer low and w/ improvement in o2 status will defer CTA, trend ddimer  - Inflammatory markers downtrending nicely, trend Q48  - ID following  - AC w/ lovenox BID    2. Sinus rhythm w/ frequent PVCs and bigemeny w/ episode of asymptomatic bradycardia to the 30s  - Pt w/ known hx of bigeminy per report  - Seen by cardio, DC metoprolol, continue norvasc, o/p follow up with cardio on DC    3. Hyperkalemia  - Resolved, monitor BMP    4. DM II  - Diabetic diet  - Lantus and premeal ordered  - FS TIDAC    5. RA - not on dmard  - No acute exacerbation    6. HTN  - Toprol dced by cardio due to the above  - C/w norvasc, monitor BP  - IV hydralazine ordered PRN for now    7. Constipation  - Miralax and senna ordered     #DVT ppx  - lovenox BID    Dispo/LOS anticipation: Pending clinical course, improvement of oxygenation. Now weaned to 2L NC. Will get PT eval   70 yr old female with DM II on insulin, HTN, RA, HLD who presents with acute hypoxemic respiratory failure in the setting of positive COVID.     1. Acute hypoxic respiratory failure 2/2 COVID-19  - Improving  - Currently weaned to 2L NC maintaining sats, doing well    - Completed 5 day course of remdesivir   - Continue dexamethasone (day 5)  - D dimer low and w/ improvement in o2 status will defer CTA, trend ddimer  - Inflammatory markers downtrending nicely, repeat tomorrow 12/10  - ID following  - AC w/ lovenox BID    2. Sinus rhythm w/ frequent PVCs and bigemeny w/ episode of asymptomatic bradycardia to the 30s  - Pt w/ known hx of bigeminy per report  - Seen by cardio, DC metoprolol, continue norvasc, o/p follow up with cardio on DC    3. Hyperkalemia  - Resolved, monitor BMP    4. DM II  - Diabetic diet  - Lantus and premeal ordered  - FS TIDAC    5. RA - not on dmard  - No acute exacerbation    6. HTN  - Toprol dced by cardio due to the above  - C/w norvasc, monitor BP  - IV hydralazine ordered PRN for now    7. Constipation  - Miralax and senna ordered     #DVT ppx  - lovenox BID    Dispo/LOS anticipation: Pending clinical course, improvement of oxygenation. Now weaned to 2L NC. Will get PT eval   70 yr old female with DM II on insulin, HTN, RA, HLD who presents with acute hypoxemic respiratory failure in the setting of positive COVID.     1. Acute hypoxic respiratory failure 2/2 COVID-19  - Improving  - Currently weaned to 2L NC maintaining sats, doing well    - Completed 5 day course of remdesivir   - Continue dexamethasone (day 5)  - D dimer low and w/ improvement in o2 status will defer CTA, trend ddimer  - Inflammatory markers downtrending nicely, repeat tomorrow 12/10  - ID following  - AC w/ lovenox BID    2. Sinus rhythm w/ frequent PVCs and bigemeny w/ episode of asymptomatic bradycardia to the 30s  - Pt w/ known hx of bigeminy per report  - Seen by cardio, DC metoprolol, continue norvasc, o/p follow up with cardio on DC    3. Hyperkalemia  - Resolved, monitor BMP    4. DM II  - Diabetic diet  - Lantus and premeal ordered  - FS TIDAC    5. RA - not on dmard  - No acute exacerbation    6. HTN  - Toprol dced by cardio due to the above  - Will increase norvasc to 10mg QD now that toprol dced, SBP running around high 150s. Monitor   - IV hydralazine ordered PRN for now    7. Constipation  - Miralax and senna ordered     #DVT ppx  - lovenox BID    Dispo/LOS anticipation: Pending clinical course, improvement of oxygenation. Now weaned to 2L NC. Will get PT eval

## 2020-12-10 ENCOUNTER — TRANSCRIPTION ENCOUNTER (OUTPATIENT)
Age: 70
End: 2020-12-10

## 2020-12-10 VITALS
OXYGEN SATURATION: 93 % | HEART RATE: 58 BPM | DIASTOLIC BLOOD PRESSURE: 87 MMHG | TEMPERATURE: 98 F | SYSTOLIC BLOOD PRESSURE: 163 MMHG | RESPIRATION RATE: 20 BRPM

## 2020-12-10 LAB
ALBUMIN SERPL ELPH-MCNC: 2.9 G/DL — LOW (ref 3.3–5.2)
ALP SERPL-CCNC: 88 U/L — SIGNIFICANT CHANGE UP (ref 40–120)
ALT FLD-CCNC: 94 U/L — HIGH
ANION GAP SERPL CALC-SCNC: 9 MMOL/L — SIGNIFICANT CHANGE UP (ref 5–17)
AST SERPL-CCNC: 51 U/L — HIGH
BILIRUB SERPL-MCNC: 0.6 MG/DL — SIGNIFICANT CHANGE UP (ref 0.4–2)
BUN SERPL-MCNC: 36 MG/DL — HIGH (ref 8–20)
CALCIUM SERPL-MCNC: 8.9 MG/DL — SIGNIFICANT CHANGE UP (ref 8.6–10.2)
CHLORIDE SERPL-SCNC: 104 MMOL/L — SIGNIFICANT CHANGE UP (ref 98–107)
CO2 SERPL-SCNC: 24 MMOL/L — SIGNIFICANT CHANGE UP (ref 22–29)
CREAT SERPL-MCNC: 0.96 MG/DL — SIGNIFICANT CHANGE UP (ref 0.5–1.3)
CRP SERPL-MCNC: 1 MG/DL — HIGH (ref 0–0.4)
FERRITIN SERPL-MCNC: 779 NG/ML — HIGH (ref 15–150)
GLUCOSE BLDC GLUCOMTR-MCNC: 173 MG/DL — HIGH (ref 70–99)
GLUCOSE BLDC GLUCOMTR-MCNC: 218 MG/DL — HIGH (ref 70–99)
GLUCOSE BLDC GLUCOMTR-MCNC: 85 MG/DL — SIGNIFICANT CHANGE UP (ref 70–99)
GLUCOSE SERPL-MCNC: 76 MG/DL — SIGNIFICANT CHANGE UP (ref 70–99)
LDH SERPL L TO P-CCNC: 211 U/L — HIGH (ref 98–192)
POTASSIUM SERPL-MCNC: 4.4 MMOL/L — SIGNIFICANT CHANGE UP (ref 3.5–5.3)
POTASSIUM SERPL-SCNC: 4.4 MMOL/L — SIGNIFICANT CHANGE UP (ref 3.5–5.3)
PROT SERPL-MCNC: 6.9 G/DL — SIGNIFICANT CHANGE UP (ref 6.6–8.7)
SODIUM SERPL-SCNC: 137 MMOL/L — SIGNIFICANT CHANGE UP (ref 135–145)

## 2020-12-10 PROCEDURE — 99239 HOSP IP/OBS DSCHRG MGMT >30: CPT | Mod: CS

## 2020-12-10 RX ORDER — METOPROLOL TARTRATE 50 MG
1 TABLET ORAL
Qty: 0 | Refills: 0 | DISCHARGE
Start: 2020-12-10

## 2020-12-10 RX ORDER — AMLODIPINE BESYLATE 2.5 MG/1
5 TABLET ORAL ONCE
Refills: 0 | Status: DISCONTINUED | OUTPATIENT
Start: 2020-12-11 | End: 2020-12-10

## 2020-12-10 RX ORDER — AMLODIPINE BESYLATE 2.5 MG/1
1 TABLET ORAL
Qty: 0 | Refills: 0 | DISCHARGE

## 2020-12-10 RX ORDER — METOPROLOL TARTRATE 50 MG
1 TABLET ORAL
Qty: 0 | Refills: 0 | DISCHARGE

## 2020-12-10 RX ORDER — METOPROLOL TARTRATE 50 MG
50 TABLET ORAL DAILY
Refills: 0 | Status: DISCONTINUED | OUTPATIENT
Start: 2020-12-11 | End: 2020-12-10

## 2020-12-10 RX ADMIN — AMLODIPINE BESYLATE 10 MILLIGRAM(S): 2.5 TABLET ORAL at 05:51

## 2020-12-10 RX ADMIN — Medication 6 MILLIGRAM(S): at 05:51

## 2020-12-10 RX ADMIN — ENOXAPARIN SODIUM 40 MILLIGRAM(S): 100 INJECTION SUBCUTANEOUS at 11:48

## 2020-12-10 RX ADMIN — POLYETHYLENE GLYCOL 3350 17 GRAM(S): 17 POWDER, FOR SOLUTION ORAL at 11:47

## 2020-12-10 RX ADMIN — Medication 2: at 11:44

## 2020-12-10 RX ADMIN — Medication 1 PACKET(S): at 11:47

## 2020-12-10 RX ADMIN — Medication 3 UNIT(S): at 09:07

## 2020-12-10 RX ADMIN — Medication 600 MILLIGRAM(S): at 05:51

## 2020-12-10 RX ADMIN — Medication 600 MILLIGRAM(S): at 16:48

## 2020-12-10 RX ADMIN — Medication 1000 MILLIGRAM(S): at 11:46

## 2020-12-10 RX ADMIN — Medication 100 MILLIGRAM(S): at 11:46

## 2020-12-10 RX ADMIN — Medication 3 UNIT(S): at 11:44

## 2020-12-10 RX ADMIN — MAGNESIUM OXIDE 400 MG ORAL TABLET 400 MILLIGRAM(S): 241.3 TABLET ORAL at 11:47

## 2020-12-10 RX ADMIN — Medication 3 UNIT(S): at 16:42

## 2020-12-10 RX ADMIN — ZINC SULFATE TAB 220 MG (50 MG ZINC EQUIVALENT) 220 MILLIGRAM(S): 220 (50 ZN) TAB at 11:46

## 2020-12-10 RX ADMIN — Medication 4: at 16:42

## 2020-12-10 NOTE — PROGRESS NOTE ADULT - ATTENDING COMMENTS
Pt seen and examined at bedside. No acute events reported overnight. Feels better, wants to go home. Pt desaturated on room air on ambulation, improved w/ O2. Pt stable for d/c pending home O2 arranged. Will resume BB and pt to follow up outpt w/ her Cardiologist as she is off Remdisivr.
history of PVCs and bigemini. frequent.    Bradycardia likely due to remdesevir.   metoprolol on hold due to shyanne. resume metoprolol once off remdesevir.   as outpatient will need Holter for PVC counter and burden and e uptitrate meds or advance therapeutic options.  no inpatietn cardiac work up.
Pt seen and examined at bedside. No acute events reported overnight. Pt now weaned to 4L NC, doing much better. Feels improved. Bradycardia resolved, likely 2/2 to Remdisvir per Cardio. BB being held, continues ot have asymptomatic PVCs. Possible d/c in 24-48 hours pending continued improvement. May require home O2.    Discussed current clinical condition w/ pt's daughter
Pt seen and examined at bedside. Reports feeling better, oxygen being weaned down. Tele showed episodes of PVCs which pt has a history for. Also had w/ episodes of sinus shyanne (asymptomatic). BB held. EKG shows PVCs w/ QT prolongation. Cardiology consulted.
Patient seen and examined at bedside. No acute events reported overnight. No new complaints. States she feels better, wants to go home. O2 weaned to 2L. Norvasc increased. PT consulted. Possible d/c tomorrow, will assess for home O2 needs.

## 2020-12-10 NOTE — DISCHARGE NOTE PROVIDER - HOSPITAL COURSE
70 yr old female with DM II on insulin, HTN, RA, HLD who presents with acute hypoxemic respiratory failure in the setting of positive COVID. Pt was started on remdesivir, steroids and supplemental O2. Eventually weaned to 1L NC and will require home o2 on discharge. During admission, pt noted with frequent PVCs/bigeminy on monitor and single episode of bradycardia. Pt was seen in consult by cardiology, bradycardia likely 2/2 remdesivir, metoprolol was held until remdesivir was completed. Ok to resume all cardiac meds on discharge. Pt was evaluated by physical therapy prior to discharge, cleared for DC home.     Vital Signs Last 24 Hrs  T(C): 36.5 (10 Dec 2020 10:06), Max: 36.8 (09 Dec 2020 15:58)  T(F): 97.7 (10 Dec 2020 10:06), Max: 98.2 (09 Dec 2020 15:58)  HR: 69 (10 Dec 2020 10:06) (60 - 77)  BP: 122/72 (10 Dec 2020 10:06) (122/72 - 146/86)  BP(mean): --  RR: 20 (10 Dec 2020 10:06) (18 - 20)  SpO2: 94% (10 Dec 2020 10:06) (92% - 94%) on 1-2L NC    PHYSICAL EXAM:  CONSTITUTIONAL: NAD, speaking in full sentences w/o difficulty, now on 1 L at rest sating well   HEENT: NC/AT, PERRL, no JVD  RESPIRATORY: CTA bilaterally, normal effort  CARDIOVASCULAR: RRR, S1/S2+, no m/g/r  ABDOMEN: Nontender to palpation, normoactive bowel sounds, no rebound/guarding; No hepatosplenomegaly  MUSCLOSKELETAL: No edema, cyanosis or deformities.  PSYCH: A+O to person, place, and time; affect appropriate  NEUROLOGY: CN 2-12 are intact and symmetric; no gross neurological deficits.     70 yr old female with DM II on insulin, HTN, RA, HLD who presents with acute hypoxemic respiratory failure in the setting of positive COVID. Pt was started on remdesivir, steroids and supplemental O2. Will require 3L NC o2 on discharge.  During admission, pt noted with frequent PVCs/bigeminy on monitor and single episode of bradycardia. Pt was seen in consult by cardiology, bradycardia likely 2/2 remdesivir, metoprolol was held until remdesivir was completed. Ok to resume all cardiac meds on discharge. Pt was evaluated by physical therapy prior to discharge, cleared for DC home.     Vital Signs Last 24 Hrs  T(C): 36.5 (10 Dec 2020 10:06), Max: 36.8 (09 Dec 2020 15:58)  T(F): 97.7 (10 Dec 2020 10:06), Max: 98.2 (09 Dec 2020 15:58)  HR: 69 (10 Dec 2020 10:06) (60 - 77)  BP: 122/72 (10 Dec 2020 10:06) (122/72 - 146/86)  BP(mean): --  RR: 20 (10 Dec 2020 10:06) (18 - 20)  SpO2: 94% (10 Dec 2020 10:06) (92% - 94%) on 1-2L NC    PHYSICAL EXAM:  CONSTITUTIONAL: NAD, speaking in full sentences w/o difficulty, now on 1 L at rest sating well   HEENT: NC/AT, PERRL, no JVD  RESPIRATORY: CTA bilaterally, normal effort  CARDIOVASCULAR: RRR, S1/S2+, no m/g/r  ABDOMEN: Nontender to palpation, normoactive bowel sounds, no rebound/guarding; No hepatosplenomegaly  MUSCLOSKELETAL: No edema, cyanosis or deformities.  PSYCH: A+O to person, place, and time; affect appropriate  NEUROLOGY: CN 2-12 are intact and symmetric; no gross neurological deficits.     70 yr old female with DM II on insulin, HTN, RA, HLD who presents with acute hypoxemic respiratory failure in the setting of positive COVID. Pt was started on remdesivir, steroids and supplemental O2. Will require 3L NC o2 on discharge.  During admission, pt noted with frequent PVCs/bigeminy on monitor and single episode of bradycardia. Pt was seen in consult by cardiology, bradycardia likely 2/2 remdesivir, metoprolol was held until remdesivir was completed. Ok to resume all cardiac meds on discharge. Pt was evaluated by physical therapy prior to discharge, cleared for DC home.     D/c time spent coordinating 38 mins    Vital Signs Last 24 Hrs  T(C): 36.5 (10 Dec 2020 10:06), Max: 36.8 (09 Dec 2020 15:58)  T(F): 97.7 (10 Dec 2020 10:06), Max: 98.2 (09 Dec 2020 15:58)  HR: 69 (10 Dec 2020 10:06) (60 - 77)  BP: 122/72 (10 Dec 2020 10:06) (122/72 - 146/86)  BP(mean): --  RR: 20 (10 Dec 2020 10:06) (18 - 20)  SpO2: 94% (10 Dec 2020 10:06) (92% - 94%) on 1-2L NC    PHYSICAL EXAM:  CONSTITUTIONAL: NAD, speaking in full sentences w/o difficulty, now on 1 L at rest sating well   HEENT: NC/AT, PERRL, no JVD  RESPIRATORY: CTA bilaterally, normal effort  CARDIOVASCULAR: RRR, S1/S2+, no m/g/r  ABDOMEN: Nontender to palpation, normoactive bowel sounds, no rebound/guarding; No hepatosplenomegaly  MUSCLOSKELETAL: No edema, cyanosis or deformities.  PSYCH: A+O to person, place, and time; affect appropriate  NEUROLOGY: CN 2-12 are intact and symmetric; no gross neurological deficits.

## 2020-12-10 NOTE — DISCHARGE NOTE PROVIDER - NSDCFUADDINST_GEN_ALL_CORE_FT
You have tested POSITIVE for the novel coronavirus (COVID-19). Upon discharge, you must self-quarantine for 14 days, or until the Department of Health contacts you.  You should restrict activities outside of your home except for getting medical care. Do not go to work, school or public areas. Avoid using public transportation. Please wear a face mask if you are around other individuals. Try to avoid contact with house members, family, and friends for the duration of this quarantine. Please follow up with your primary care physician within 2-3 weeks of your discharge from [facility]. Please take all medications as prescribed. If you experience any worsening or recurrence of your symptoms, particularly worsening or high fever, shortness of breathe, extreme fatigue, or bloody cough please call 9-1-1 immediately or report to the nearest Emergency Department. If you have any questions or concerns, please do not hesitate to call the hospital at [phone]. - Please take medications as reconciled.  - Please follow up w/ your PMD within 2 weeks  - You are currently stable for discharge and no longer require inpatient hospitalization, however if your condition worsens please seek immediate medical attention.  - You have tested POSITIVE for the novel coronavirus (COVID-19). Upon discharge, you must self-quarantine for 14 days, or until the Department of Health contacts you.  You should restrict activities outside of your home except for getting medical care. Do not go to work, school or public areas. Avoid using public transportation. Please wear a face mask if you are around other individuals. Try to avoid contact with house members, family, and friends for the duration of this quarantine. Please follow up with your primary care physician within 2-3 weeks of your discharge from [facility]. Please take all medications as prescribed. If you experience any worsening or recurrence of your symptoms, particularly worsening or high fever, shortness of breathe, extreme fatigue, or bloody cough please call 9-1-1 immediately or report to the nearest Emergency Department. If you have any questions or concerns, please do not hesitate to call the hospital at [phone].

## 2020-12-10 NOTE — DISCHARGE NOTE NURSING/CASE MANAGEMENT/SOCIAL WORK - PATIENT PORTAL LINK FT
You can access the FollowMyHealth Patient Portal offered by [facility] by registering at the following website: [email or web address]. By joining [employer]’s FollowMyHealth portal, you will also be able to view your health information using other applications (apps) compatible with our system.

## 2020-12-10 NOTE — PROGRESS NOTE ADULT - SUBJECTIVE AND OBJECTIVE BOX
[facility] Division of Hospital Medicine  Samson Gomes [phone]    Chief Complaint:  Patient is a 70y old  Female who presents with a chief complaint of     Patient seen and examined at bedside  Patient sitting up in bed, sitting up in bed  States feeling well, no complaints, excited to get home  Currently on 2L NC at rest, comfortable, speaking full sentences  ROS negative - denies headache, dizziness, n/v, fever, chills, chest pain, SOB  VSS on 2L NC    Vital Signs Last 24 Hrs  T(C): 36.5 (10 Dec 2020 10:06), Max: 36.8 (09 Dec 2020 15:58)  T(F): 97.7 (10 Dec 2020 10:06), Max: 98.2 (09 Dec 2020 15:58)  HR: 69 (10 Dec 2020 10:06) (60 - 77)  BP: 122/72 (10 Dec 2020 10:06) (122/72 - 146/86)  BP(mean): --  RR: 20 (10 Dec 2020 10:06) (18 - 20)  SpO2: 94% (10 Dec 2020 10:06) (92% - 94%) on 2-3L NC    PHYSICAL EXAM:  CONSTITUTIONAL: NAD, speaking in full sentences w/o difficulty, now on 2 L sating well   HEENT: NC/AT, PERRL, no JVD  RESPIRATORY: CTA bilaterally, normal effort  CARDIOVASCULAR: RRR, S1/S2+, no m/g/r  ABDOMEN: Nontender to palpation, normoactive bowel sounds, no rebound/guarding; No hepatosplenomegaly  MUSCLOSKELETAL: No edema, cyanosis or deformities.  PSYCH: A+O to person, place, and time; affect appropriate  NEUROLOGY: CN 2-12 are intact and symmetric; no gross neurological deficits.      LABS/IMAGING: REVIEWED

## 2020-12-10 NOTE — CHART NOTE - NSCHARTNOTEFT_GEN_A_CORE
PA event note    Pt will require o2 on discharge. On room air on ambulation pt o2 sat 82%. Improved to >90% on 3L NC. PA event note    Pt will require o2 on discharge. On room air at rest pt o2 sat 82%. Improved to 93% on 3L NC.

## 2020-12-10 NOTE — DISCHARGE NOTE PROVIDER - NSDCMRMEDTOKEN_GEN_ALL_CORE_FT
Aspir 81 oral delayed release tablet: 1 tab(s) orally once a day  glipiZIDE 5 mg oral tablet: 1 tab(s) orally BID a day  Lipitor 20 mg oral tablet: 1 tab(s) orally once a day  metFORMIN 500 mg oral tablet: 1 tab(s) orally 2 times a day  Pt states she is not allergic to Metformin   Norvasc 5 mg oral tablet: 1 tab(s) orally once a day  predniSONE 10 mg oral tablet: 1 tab(s) orally BID a day  Toprol-XL 50 mg oral tablet, extended release: 1 tab(s) orally once a day  Tresiba 100 units/mL subcutaneous solution: 49 u SC HS   Aspir 81 oral delayed release tablet: 1 tab(s) orally once a day  glipiZIDE 5 mg oral tablet: 1 tab(s) orally BID a day  Lipitor 20 mg oral tablet: 1 tab(s) orally once a day  metFORMIN 500 mg oral tablet: 1 tab(s) orally 2 times a day  Pt states she is not allergic to Metformin   metoprolol succinate 50 mg oral tablet, extended release: 1 tab(s) orally once a day  Norvasc 5 mg oral tablet: 1 tab(s) orally once a day  predniSONE 10 mg oral tablet: 1 tab(s) orally BID a day  Tresiba 100 units/mL subcutaneous solution: 49 u SC HS

## 2020-12-10 NOTE — PROGRESS NOTE ADULT - ASSESSMENT
70 yr old female with DM II on insulin, HTN, RA, HLD who presents with acute hypoxemic respiratory failure in the setting of positive COVID.     1. Acute hypoxic respiratory failure 2/2 COVID-19  - Improving  - On 2L NC maintaining sats, doing well. Will DC home with O2.   - Completed 5 day course of remdesivir & decadron   - D dimer low and w/ improvement in o2 status will defer CTA, trend ddimer  - Inflammatory markers downtrending nicely, repeat tomorrow 12/10  - ID following  - AC w/ lovenox BID    2. Sinus rhythm w/ frequent PVCs and bigemeny w/ episode of asymptomatic bradycardia to the 30s  - Pt w/ known hx of bigeminy per report  - Seen by cardio, ok to resume metoprolol now that remdesivir is discontinued    3. Hyperkalemia  - Resolved, monitor BMP    4. DM II  - Diabetic diet  - Lantus and premeal ordered  - FS TIDAC    5. RA - not on dmard  - Not in acute exacerbation  - Resume prednisone on discharge    6. HTN  - Toprol resumed. Continue Norvasc at home dose    7. Constipation  - Miralax and senna ordered     #DVT ppx  - lovenox BID    Dispo/LOS anticipation: Medically ready for discharge pending O2 arrangements at home.

## 2020-12-10 NOTE — DISCHARGE NOTE PROVIDER - NSDCCPCAREPLAN_GEN_ALL_CORE_FT
PRINCIPAL DISCHARGE DIAGNOSIS  Diagnosis: COVID-19 with multiple comorbidities  Assessment and Plan of Treatment: Improved. Stable for DC home with home oxygen. Wean oxygen at home as able. Follow covid dc instructions. Quarantine for 14 days. Continue aspirin thearpy at home      SECONDARY DISCHARGE DIAGNOSES  Diagnosis: Diabetes mellitus  Assessment and Plan of Treatment: Resume home meds    Diagnosis: Bigeminy  Assessment and Plan of Treatment: Outpatient follow up [facility] cardiology    Diagnosis: HTN (hypertension)  Assessment and Plan of Treatment: Resume home meds at home doses     PRINCIPAL DISCHARGE DIAGNOSIS  Diagnosis: COVID-19 with multiple comorbidities  Assessment and Plan of Treatment: Improved. Stable for DC home with home oxygen. Wean oxygen at home as able. Follow covid dc instructions. Quarantine for 14 days. Continue aspirin thearpy at home      SECONDARY DISCHARGE DIAGNOSES  Diagnosis: RA (rheumatoid arthritis)  Assessment and Plan of Treatment: Resume home meds    Diagnosis: Diabetes mellitus  Assessment and Plan of Treatment: Resume home meds    Diagnosis: Bigeminy  Assessment and Plan of Treatment: Outpatient follow up [facility] cardiology    Diagnosis: HTN (hypertension)  Assessment and Plan of Treatment: Resume home meds at home doses

## 2020-12-28 PROCEDURE — 86769 SARS-COV-2 COVID-19 ANTIBODY: CPT

## 2020-12-28 PROCEDURE — 36415 COLL VENOUS BLD VENIPUNCTURE: CPT

## 2020-12-28 PROCEDURE — 85025 COMPLETE CBC W/AUTO DIFF WBC: CPT

## 2020-12-28 PROCEDURE — 84443 ASSAY THYROID STIM HORMONE: CPT

## 2020-12-28 PROCEDURE — 85610 PROTHROMBIN TIME: CPT

## 2020-12-28 PROCEDURE — 82728 ASSAY OF FERRITIN: CPT

## 2020-12-28 PROCEDURE — 82803 BLOOD GASES ANY COMBINATION: CPT

## 2020-12-28 PROCEDURE — 82962 GLUCOSE BLOOD TEST: CPT

## 2020-12-28 PROCEDURE — U0003: CPT

## 2020-12-28 PROCEDURE — 85027 COMPLETE CBC AUTOMATED: CPT

## 2020-12-28 PROCEDURE — 83735 ASSAY OF MAGNESIUM: CPT

## 2020-12-28 PROCEDURE — 86803 HEPATITIS C AB TEST: CPT

## 2020-12-28 PROCEDURE — 84145 PROCALCITONIN (PCT): CPT

## 2020-12-28 PROCEDURE — 84484 ASSAY OF TROPONIN QUANT: CPT

## 2020-12-28 PROCEDURE — 80053 COMPREHEN METABOLIC PANEL: CPT

## 2020-12-28 PROCEDURE — 80048 BASIC METABOLIC PNL TOTAL CA: CPT

## 2020-12-28 PROCEDURE — 97163 PT EVAL HIGH COMPLEX 45 MIN: CPT

## 2020-12-28 PROCEDURE — 85379 FIBRIN DEGRADATION QUANT: CPT

## 2020-12-28 PROCEDURE — 83036 HEMOGLOBIN GLYCOSYLATED A1C: CPT

## 2020-12-28 PROCEDURE — 96374 THER/PROPH/DIAG INJ IV PUSH: CPT

## 2020-12-28 PROCEDURE — 83615 LACTATE (LD) (LDH) ENZYME: CPT

## 2020-12-28 PROCEDURE — 81001 URINALYSIS AUTO W/SCOPE: CPT

## 2020-12-28 PROCEDURE — 99285 EMERGENCY DEPT VISIT HI MDM: CPT | Mod: 25

## 2020-12-28 PROCEDURE — 93005 ELECTROCARDIOGRAM TRACING: CPT

## 2020-12-28 PROCEDURE — 85730 THROMBOPLASTIN TIME PARTIAL: CPT

## 2020-12-28 PROCEDURE — 86140 C-REACTIVE PROTEIN: CPT

## 2020-12-28 PROCEDURE — 84100 ASSAY OF PHOSPHORUS: CPT

## 2020-12-28 PROCEDURE — 71045 X-RAY EXAM CHEST 1 VIEW: CPT

## 2021-02-02 ENCOUNTER — APPOINTMENT (OUTPATIENT)
Dept: SURGERY | Facility: CLINIC | Age: 71
End: 2021-02-02

## 2021-03-07 ENCOUNTER — APPOINTMENT (OUTPATIENT)
Dept: NUCLEAR MEDICINE | Facility: IMAGING CENTER | Age: 71
End: 2021-03-07
Payer: MEDICARE

## 2021-03-07 ENCOUNTER — OUTPATIENT (OUTPATIENT)
Dept: OUTPATIENT SERVICES | Facility: HOSPITAL | Age: 71
LOS: 1 days | End: 2021-03-07
Payer: MEDICARE

## 2021-03-07 DIAGNOSIS — Z90.710 ACQUIRED ABSENCE OF BOTH CERVIX AND UTERUS: Chronic | ICD-10-CM

## 2021-03-07 DIAGNOSIS — Z98.890 OTHER SPECIFIED POSTPROCEDURAL STATES: Chronic | ICD-10-CM

## 2021-03-07 DIAGNOSIS — Z00.8 ENCOUNTER FOR OTHER GENERAL EXAMINATION: ICD-10-CM

## 2021-03-07 DIAGNOSIS — Z90.49 ACQUIRED ABSENCE OF OTHER SPECIFIED PARTS OF DIGESTIVE TRACT: Chronic | ICD-10-CM

## 2021-03-07 DIAGNOSIS — Z98.89 OTHER SPECIFIED POSTPROCEDURAL STATES: Chronic | ICD-10-CM

## 2021-03-07 PROCEDURE — 78816 PET IMAGE W/CT FULL BODY: CPT | Mod: 26,PS,MH

## 2021-03-07 PROCEDURE — 78816 PET IMAGE W/CT FULL BODY: CPT

## 2021-03-07 PROCEDURE — A9552: CPT

## 2021-07-31 ENCOUNTER — APPOINTMENT (OUTPATIENT)
Dept: NUCLEAR MEDICINE | Facility: IMAGING CENTER | Age: 71
End: 2021-07-31
Payer: MEDICARE

## 2021-07-31 ENCOUNTER — OUTPATIENT (OUTPATIENT)
Dept: OUTPATIENT SERVICES | Facility: HOSPITAL | Age: 71
LOS: 1 days | End: 2021-07-31
Payer: MEDICARE

## 2021-07-31 DIAGNOSIS — Z90.710 ACQUIRED ABSENCE OF BOTH CERVIX AND UTERUS: Chronic | ICD-10-CM

## 2021-07-31 DIAGNOSIS — Z00.8 ENCOUNTER FOR OTHER GENERAL EXAMINATION: ICD-10-CM

## 2021-07-31 DIAGNOSIS — Z90.49 ACQUIRED ABSENCE OF OTHER SPECIFIED PARTS OF DIGESTIVE TRACT: Chronic | ICD-10-CM

## 2021-07-31 DIAGNOSIS — Z98.89 OTHER SPECIFIED POSTPROCEDURAL STATES: Chronic | ICD-10-CM

## 2021-07-31 DIAGNOSIS — C69.00: ICD-10-CM

## 2021-07-31 DIAGNOSIS — Z98.890 OTHER SPECIFIED POSTPROCEDURAL STATES: Chronic | ICD-10-CM

## 2021-07-31 PROCEDURE — A9552: CPT

## 2021-07-31 PROCEDURE — 78816 PET IMAGE W/CT FULL BODY: CPT | Mod: 26,PS,MH

## 2021-07-31 PROCEDURE — 78816 PET IMAGE W/CT FULL BODY: CPT | Mod: MH

## 2022-05-13 ENCOUNTER — APPOINTMENT (OUTPATIENT)
Dept: NUCLEAR MEDICINE | Facility: IMAGING CENTER | Age: 72
End: 2022-05-13
Payer: MEDICARE

## 2022-05-13 ENCOUNTER — OUTPATIENT (OUTPATIENT)
Dept: OUTPATIENT SERVICES | Facility: HOSPITAL | Age: 72
LOS: 1 days | End: 2022-05-13
Payer: MEDICARE

## 2022-05-13 DIAGNOSIS — Z98.890 OTHER SPECIFIED POSTPROCEDURAL STATES: Chronic | ICD-10-CM

## 2022-05-13 DIAGNOSIS — Z98.89 OTHER SPECIFIED POSTPROCEDURAL STATES: Chronic | ICD-10-CM

## 2022-05-13 DIAGNOSIS — Z90.49 ACQUIRED ABSENCE OF OTHER SPECIFIED PARTS OF DIGESTIVE TRACT: Chronic | ICD-10-CM

## 2022-05-13 DIAGNOSIS — Z00.8 ENCOUNTER FOR OTHER GENERAL EXAMINATION: ICD-10-CM

## 2022-05-13 DIAGNOSIS — Z90.710 ACQUIRED ABSENCE OF BOTH CERVIX AND UTERUS: Chronic | ICD-10-CM

## 2022-05-13 PROCEDURE — 78816 PET IMAGE W/CT FULL BODY: CPT | Mod: 26,PS,KX,MH

## 2022-05-13 PROCEDURE — A9552: CPT

## 2022-05-13 PROCEDURE — 78816 PET IMAGE W/CT FULL BODY: CPT | Mod: MH

## 2022-08-23 NOTE — H&P PST ADULT - ACTIVITY
Date Of Previous Biopsy (Optional): 8/1/2022 walks slowly with cane or walker walks slowly with cane or walker due to pain from RA

## 2022-11-11 ENCOUNTER — OFFICE (OUTPATIENT)
Dept: URBAN - METROPOLITAN AREA CLINIC 63 | Facility: CLINIC | Age: 72
Setting detail: OPHTHALMOLOGY
End: 2022-11-11
Payer: MEDICARE

## 2022-11-11 DIAGNOSIS — E11.3313: ICD-10-CM

## 2022-11-11 DIAGNOSIS — E11.3312: ICD-10-CM

## 2022-11-11 PROCEDURE — 92134 CPTRZ OPH DX IMG PST SGM RTA: CPT | Performed by: OPHTHALMOLOGY

## 2022-11-11 PROCEDURE — 67028 INJECTION EYE DRUG: CPT | Performed by: OPHTHALMOLOGY

## 2022-11-11 ASSESSMENT — VISUAL ACUITY
OS_BCVA: 20/20
OD_BCVA: 20/150

## 2022-11-11 ASSESSMENT — PACHYMETRY
OS_CT_UM: 555
OD_CT_UM: 567
OS_CT_CORRECTION: -1
OD_CT_CORRECTION: -1

## 2022-11-11 ASSESSMENT — REFRACTION_AUTOREFRACTION
OD_CYLINDER: -1.25
OD_SPHERE: +3.25
OS_SPHERE: +1.50
OS_AXIS: 091
OD_AXIS: 085
OS_CYLINDER: -2.00

## 2022-11-11 ASSESSMENT — CONFRONTATIONAL VISUAL FIELD TEST (CVF)
OS_FINDINGS: FULL
OD_FINDINGS: FULL

## 2022-11-11 ASSESSMENT — KERATOMETRY
OD_AXISANGLE_DEGREES: 177
OD_K2POWER_DIOPTERS: 41.50
OS_K1POWER_DIOPTERS: 40.25
OS_AXISANGLE_DEGREES: 014
METHOD_AUTO_MANUAL: AUTO
OS_K2POWER_DIOPTERS: 41.75
OD_K1POWER_DIOPTERS: 41.00

## 2022-11-11 ASSESSMENT — SPHEQUIV_DERIVED
OS_SPHEQUIV: 0.5
OD_SPHEQUIV: 2.625

## 2022-11-11 ASSESSMENT — AXIALLENGTH_DERIVED
OS_AL: 24.3383
OD_AL: 23.3973

## 2022-11-21 ENCOUNTER — OFFICE (OUTPATIENT)
Dept: URBAN - METROPOLITAN AREA CLINIC 63 | Facility: CLINIC | Age: 72
Setting detail: OPHTHALMOLOGY
End: 2022-11-21
Payer: MEDICARE

## 2022-11-21 DIAGNOSIS — E11.3311: ICD-10-CM

## 2022-11-21 PROCEDURE — 67210 TREATMENT OF RETINAL LESION: CPT | Performed by: OPHTHALMOLOGY

## 2022-11-21 ASSESSMENT — KERATOMETRY
METHOD_AUTO_MANUAL: AUTO
OD_AXISANGLE_DEGREES: 177
OD_K1POWER_DIOPTERS: 41.00
OS_K2POWER_DIOPTERS: 41.75
OD_K2POWER_DIOPTERS: 41.50
OS_K1POWER_DIOPTERS: 40.25
OS_AXISANGLE_DEGREES: 014

## 2022-11-21 ASSESSMENT — PACHYMETRY
OD_CT_CORRECTION: -1
OS_CT_CORRECTION: -1
OS_CT_UM: 555
OD_CT_UM: 567

## 2022-11-21 ASSESSMENT — REFRACTION_AUTOREFRACTION
OD_CYLINDER: -1.25
OD_AXIS: 085
OD_SPHERE: +3.25
OS_CYLINDER: -2.00
OS_SPHERE: +1.50
OS_AXIS: 091

## 2022-11-21 ASSESSMENT — VISUAL ACUITY
OD_BCVA: 20/80-1
OS_BCVA: 20/20

## 2022-11-21 ASSESSMENT — SPHEQUIV_DERIVED
OS_SPHEQUIV: 0.5
OD_SPHEQUIV: 2.625

## 2022-11-21 ASSESSMENT — CONFRONTATIONAL VISUAL FIELD TEST (CVF)
OS_FINDINGS: FULL
OD_FINDINGS: FULL

## 2022-11-21 ASSESSMENT — AXIALLENGTH_DERIVED
OD_AL: 23.3973
OS_AL: 24.3383

## 2022-11-30 ENCOUNTER — TRANSCRIPTION ENCOUNTER (OUTPATIENT)
Age: 72
End: 2022-11-30

## 2022-12-06 ENCOUNTER — OFFICE (OUTPATIENT)
Dept: URBAN - METROPOLITAN AREA CLINIC 94 | Facility: CLINIC | Age: 72
Setting detail: OPHTHALMOLOGY
End: 2022-12-06
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
## 2022-12-06 PROCEDURE — NO SHOW FE NO SHOW FEE: Performed by: OPHTHALMOLOGY

## 2022-12-13 ENCOUNTER — ASC (OUTPATIENT)
Dept: URBAN - METROPOLITAN AREA SURGERY 8 | Facility: SURGERY | Age: 72
Setting detail: OPHTHALMOLOGY
End: 2022-12-13
Payer: MEDICARE

## 2022-12-13 DIAGNOSIS — E11.3312: ICD-10-CM

## 2022-12-13 PROCEDURE — 67210 TREATMENT OF RETINAL LESION: CPT | Performed by: OPHTHALMOLOGY

## 2022-12-13 ASSESSMENT — KERATOMETRY
OS_AXISANGLE_DEGREES: 014
OD_K1POWER_DIOPTERS: 41.00
OD_AXISANGLE_DEGREES: 177
OS_K1POWER_DIOPTERS: 40.25
OS_K2POWER_DIOPTERS: 41.75
METHOD_AUTO_MANUAL: AUTO
OD_K2POWER_DIOPTERS: 41.50

## 2022-12-13 ASSESSMENT — PACHYMETRY
OS_CT_CORRECTION: -1
OD_CT_CORRECTION: -1
OS_CT_UM: 555
OD_CT_UM: 567

## 2022-12-13 ASSESSMENT — REFRACTION_AUTOREFRACTION
OS_SPHERE: +1.50
OD_SPHERE: +3.25
OS_AXIS: 091
OD_CYLINDER: -1.25
OD_AXIS: 085
OS_CYLINDER: -2.00

## 2022-12-13 ASSESSMENT — AXIALLENGTH_DERIVED
OD_AL: 23.3973
OS_AL: 24.3383

## 2022-12-13 ASSESSMENT — VISUAL ACUITY
OS_BCVA: 20/25
OD_BCVA: 20/80-1

## 2022-12-13 ASSESSMENT — TONOMETRY
OS_IOP_MMHG: 17
OD_IOP_MMHG: 19

## 2022-12-13 ASSESSMENT — SPHEQUIV_DERIVED
OS_SPHEQUIV: 0.5
OD_SPHEQUIV: 2.625

## 2022-12-13 ASSESSMENT — CONFRONTATIONAL VISUAL FIELD TEST (CVF)
OS_FINDINGS: FULL
OD_FINDINGS: FULL

## 2022-12-19 ENCOUNTER — OFFICE (OUTPATIENT)
Dept: URBAN - METROPOLITAN AREA CLINIC 63 | Facility: CLINIC | Age: 72
Setting detail: OPHTHALMOLOGY
End: 2022-12-19
Payer: MEDICARE

## 2022-12-19 DIAGNOSIS — E11.3312: ICD-10-CM

## 2022-12-19 PROCEDURE — 67028 INJECTION EYE DRUG: CPT | Performed by: OPHTHALMOLOGY

## 2022-12-19 ASSESSMENT — KERATOMETRY
METHOD_AUTO_MANUAL: AUTO
OS_AXISANGLE_DEGREES: 014
OD_K2POWER_DIOPTERS: 41.50
OS_K2POWER_DIOPTERS: 41.75
OD_K1POWER_DIOPTERS: 41.00
OS_K1POWER_DIOPTERS: 40.25
OD_AXISANGLE_DEGREES: 177

## 2022-12-19 ASSESSMENT — REFRACTION_AUTOREFRACTION
OD_AXIS: 085
OS_AXIS: 091
OD_SPHERE: +3.25
OS_SPHERE: +1.50
OD_CYLINDER: -1.25
OS_CYLINDER: -2.00

## 2022-12-19 ASSESSMENT — CONFRONTATIONAL VISUAL FIELD TEST (CVF)
OD_FINDINGS: FULL
OS_FINDINGS: FULL

## 2022-12-19 ASSESSMENT — SPHEQUIV_DERIVED
OD_SPHEQUIV: 2.625
OS_SPHEQUIV: 0.5

## 2022-12-19 ASSESSMENT — TONOMETRY: OS_IOP_MMHG: 18

## 2022-12-19 ASSESSMENT — VISUAL ACUITY
OD_BCVA: 20/70
OS_BCVA: 20/25

## 2022-12-19 ASSESSMENT — PACHYMETRY
OD_CT_UM: 567
OD_CT_CORRECTION: -1
OS_CT_CORRECTION: -1
OS_CT_UM: 555

## 2022-12-19 ASSESSMENT — AXIALLENGTH_DERIVED
OS_AL: 24.3383
OD_AL: 23.3973

## 2022-12-20 ENCOUNTER — OFFICE (OUTPATIENT)
Dept: URBAN - METROPOLITAN AREA CLINIC 63 | Facility: CLINIC | Age: 72
Setting detail: OPHTHALMOLOGY
End: 2022-12-20
Payer: MEDICARE

## 2022-12-20 DIAGNOSIS — Z96.1: ICD-10-CM

## 2022-12-20 DIAGNOSIS — H40.1121: ICD-10-CM

## 2022-12-20 PROCEDURE — 92012 INTRM OPH EXAM EST PATIENT: CPT | Performed by: OPHTHALMOLOGY

## 2022-12-20 PROCEDURE — 92133 CPTRZD OPH DX IMG PST SGM ON: CPT | Performed by: OPHTHALMOLOGY

## 2022-12-20 ASSESSMENT — REFRACTION_AUTOREFRACTION
OD_CYLINDER: -1.50
OD_SPHERE: +3.75
OS_CYLINDER: -1.50
OS_SPHERE: +1.00
OS_AXIS: 080
OD_AXIS: 087

## 2022-12-20 ASSESSMENT — SPHEQUIV_DERIVED
OS_SPHEQUIV: 0.25
OD_SPHEQUIV: 3

## 2022-12-20 ASSESSMENT — KERATOMETRY
OS_K1POWER_DIOPTERS: 41.25
METHOD_AUTO_MANUAL: AUTO
OD_K1POWER_DIOPTERS: 41.00
OS_AXISANGLE_DEGREES: 007
OD_AXISANGLE_DEGREES: 004
OS_K2POWER_DIOPTERS: 42.25
OD_K2POWER_DIOPTERS: 41.25

## 2022-12-20 ASSESSMENT — AXIALLENGTH_DERIVED
OD_AL: 23.2991
OS_AL: 24.1502

## 2022-12-20 ASSESSMENT — VISUAL ACUITY
OS_BCVA: 20/20
OD_BCVA: 20/60-1

## 2022-12-20 ASSESSMENT — CONFRONTATIONAL VISUAL FIELD TEST (CVF)
OS_FINDINGS: FULL
OD_FINDINGS: FULL

## 2022-12-20 ASSESSMENT — PACHYMETRY
OS_CT_UM: 555
OS_CT_CORRECTION: -1
OD_CT_UM: 567
OD_CT_CORRECTION: -1

## 2022-12-20 ASSESSMENT — TONOMETRY
OD_IOP_MMHG: 20
OS_IOP_MMHG: 15

## 2023-01-16 ENCOUNTER — OFFICE (OUTPATIENT)
Dept: URBAN - METROPOLITAN AREA CLINIC 63 | Facility: CLINIC | Age: 73
Setting detail: OPHTHALMOLOGY
End: 2023-01-16
Payer: MEDICARE

## 2023-01-16 DIAGNOSIS — E11.3313: ICD-10-CM

## 2023-01-16 DIAGNOSIS — H35.372: ICD-10-CM

## 2023-01-16 DIAGNOSIS — E11.3311: ICD-10-CM

## 2023-01-16 DIAGNOSIS — H35.033: ICD-10-CM

## 2023-01-16 DIAGNOSIS — H43.821: ICD-10-CM

## 2023-01-16 PROCEDURE — 92134 CPTRZ OPH DX IMG PST SGM RTA: CPT | Performed by: OPHTHALMOLOGY

## 2023-01-16 PROCEDURE — 67028 INJECTION EYE DRUG: CPT | Performed by: OPHTHALMOLOGY

## 2023-01-16 ASSESSMENT — KERATOMETRY
OD_K2POWER_DIOPTERS: 41.25
OS_K1POWER_DIOPTERS: 41.25
METHOD_AUTO_MANUAL: AUTO
OS_AXISANGLE_DEGREES: 007
OS_K2POWER_DIOPTERS: 42.25
OD_K1POWER_DIOPTERS: 41.00
OD_AXISANGLE_DEGREES: 004

## 2023-01-16 ASSESSMENT — VISUAL ACUITY
OS_BCVA: 20/20
OD_BCVA: 20/100-

## 2023-01-16 ASSESSMENT — PACHYMETRY
OD_CT_CORRECTION: -1
OS_CT_CORRECTION: -1
OD_CT_UM: 567
OS_CT_UM: 555

## 2023-01-16 ASSESSMENT — REFRACTION_AUTOREFRACTION
OS_SPHERE: +1.00
OD_SPHERE: +3.75
OS_AXIS: 080
OS_CYLINDER: -1.50
OD_AXIS: 087
OD_CYLINDER: -1.50

## 2023-01-16 ASSESSMENT — SPHEQUIV_DERIVED
OS_SPHEQUIV: 0.25
OD_SPHEQUIV: 3

## 2023-01-16 ASSESSMENT — CONFRONTATIONAL VISUAL FIELD TEST (CVF)
OS_FINDINGS: FULL
OD_FINDINGS: FULL

## 2023-01-16 ASSESSMENT — TONOMETRY
OS_IOP_MMHG: 18
OD_IOP_MMHG: 19

## 2023-01-16 ASSESSMENT — AXIALLENGTH_DERIVED
OS_AL: 24.1502
OD_AL: 23.2991

## 2023-03-20 ENCOUNTER — OFFICE (OUTPATIENT)
Dept: URBAN - METROPOLITAN AREA CLINIC 63 | Facility: CLINIC | Age: 73
Setting detail: OPHTHALMOLOGY
End: 2023-03-20
Payer: MEDICARE

## 2023-03-20 DIAGNOSIS — E11.3311: ICD-10-CM

## 2023-03-20 PROCEDURE — 67210 TREATMENT OF RETINAL LESION: CPT | Performed by: OPHTHALMOLOGY

## 2023-03-20 ASSESSMENT — SPHEQUIV_DERIVED
OS_SPHEQUIV: 0.25
OD_SPHEQUIV: 3

## 2023-03-20 ASSESSMENT — REFRACTION_AUTOREFRACTION
OD_AXIS: 087
OD_CYLINDER: -1.50
OS_CYLINDER: -1.50
OD_SPHERE: +3.75
OS_AXIS: 080
OS_SPHERE: +1.00

## 2023-03-20 ASSESSMENT — KERATOMETRY
OS_K1POWER_DIOPTERS: 41.25
METHOD_AUTO_MANUAL: AUTO
OD_K1POWER_DIOPTERS: 41.00
OS_K2POWER_DIOPTERS: 42.25
OD_K2POWER_DIOPTERS: 41.25
OD_AXISANGLE_DEGREES: 004
OS_AXISANGLE_DEGREES: 007

## 2023-03-20 ASSESSMENT — PACHYMETRY
OD_CT_UM: 567
OS_CT_CORRECTION: -1
OS_CT_UM: 555
OD_CT_CORRECTION: -1

## 2023-03-20 ASSESSMENT — AXIALLENGTH_DERIVED
OD_AL: 23.2991
OS_AL: 24.1502

## 2023-03-20 ASSESSMENT — VISUAL ACUITY
OS_BCVA: 20/25+1
OD_BCVA: 20/30

## 2023-04-18 ENCOUNTER — APPOINTMENT (OUTPATIENT)
Dept: NUCLEAR MEDICINE | Facility: IMAGING CENTER | Age: 73
End: 2023-04-18
Payer: MEDICARE

## 2023-04-18 ENCOUNTER — OFFICE (OUTPATIENT)
Dept: URBAN - METROPOLITAN AREA CLINIC 63 | Facility: CLINIC | Age: 73
Setting detail: OPHTHALMOLOGY
End: 2023-04-18
Payer: MEDICARE

## 2023-04-18 ENCOUNTER — OUTPATIENT (OUTPATIENT)
Dept: OUTPATIENT SERVICES | Facility: HOSPITAL | Age: 73
LOS: 1 days | End: 2023-04-18
Payer: MEDICARE

## 2023-04-18 DIAGNOSIS — R59.1 GENERALIZED ENLARGED LYMPH NODES: ICD-10-CM

## 2023-04-18 DIAGNOSIS — H40.1121: ICD-10-CM

## 2023-04-18 DIAGNOSIS — Z90.710 ACQUIRED ABSENCE OF BOTH CERVIX AND UTERUS: Chronic | ICD-10-CM

## 2023-04-18 DIAGNOSIS — H04.123: ICD-10-CM

## 2023-04-18 DIAGNOSIS — Z90.49 ACQUIRED ABSENCE OF OTHER SPECIFIED PARTS OF DIGESTIVE TRACT: Chronic | ICD-10-CM

## 2023-04-18 DIAGNOSIS — Z98.890 OTHER SPECIFIED POSTPROCEDURAL STATES: Chronic | ICD-10-CM

## 2023-04-18 DIAGNOSIS — C69.82: ICD-10-CM

## 2023-04-18 DIAGNOSIS — C44.99 OTHER SPECIFIED MALIGNANT NEOPLASM OF SKIN, UNSPECIFIED: ICD-10-CM

## 2023-04-18 DIAGNOSIS — E11.3313: ICD-10-CM

## 2023-04-18 DIAGNOSIS — Z98.89 OTHER SPECIFIED POSTPROCEDURAL STATES: Chronic | ICD-10-CM

## 2023-04-18 PROCEDURE — 92083 EXTENDED VISUAL FIELD XM: CPT | Performed by: OPHTHALMOLOGY

## 2023-04-18 PROCEDURE — 78816 PET IMAGE W/CT FULL BODY: CPT | Mod: 26,PS,KX,MH

## 2023-04-18 PROCEDURE — A9552: CPT

## 2023-04-18 PROCEDURE — 78816 PET IMAGE W/CT FULL BODY: CPT | Mod: MH

## 2023-04-18 PROCEDURE — 92014 COMPRE OPH EXAM EST PT 1/>: CPT | Performed by: OPHTHALMOLOGY

## 2023-04-18 PROCEDURE — 92134 CPTRZ OPH DX IMG PST SGM RTA: CPT | Performed by: OPHTHALMOLOGY

## 2023-04-18 ASSESSMENT — PACHYMETRY
OD_CT_CORRECTION: -1
OD_CT_UM: 567
OS_CT_CORRECTION: -1
OS_CT_UM: 555

## 2023-04-18 ASSESSMENT — CONFRONTATIONAL VISUAL FIELD TEST (CVF)
OS_FINDINGS: FULL
OD_FINDINGS: FULL

## 2023-04-18 ASSESSMENT — TONOMETRY
OD_IOP_MMHG: 21
OS_IOP_MMHG: 16

## 2023-04-19 ASSESSMENT — REFRACTION_AUTOREFRACTION
OD_AXIS: 071
OD_CYLINDER: -1.00
OD_SPHERE: +4.00
OS_SPHERE: UTP

## 2023-04-19 ASSESSMENT — KERATOMETRY
OS_K1POWER_DIOPTERS: 40.50
OD_K1POWER_DIOPTERS: 41.25
OS_AXISANGLE_DEGREES: 005
OD_AXISANGLE_DEGREES: 179
OD_K2POWER_DIOPTERS: 41.50
OS_K2POWER_DIOPTERS: 41.25
METHOD_AUTO_MANUAL: AUTO

## 2023-04-19 ASSESSMENT — SPHEQUIV_DERIVED: OD_SPHEQUIV: 3.5

## 2023-04-19 ASSESSMENT — AXIALLENGTH_DERIVED: OD_AL: 23.0229

## 2023-04-19 ASSESSMENT — VISUAL ACUITY
OD_BCVA: CF 4FT
OS_BCVA: 20/25-1

## 2023-04-27 ENCOUNTER — OFFICE (OUTPATIENT)
Dept: URBAN - METROPOLITAN AREA CLINIC 63 | Facility: CLINIC | Age: 73
Setting detail: OPHTHALMOLOGY
End: 2023-04-27
Payer: MEDICARE

## 2023-04-27 DIAGNOSIS — E11.3313: ICD-10-CM

## 2023-04-27 DIAGNOSIS — H35.033: ICD-10-CM

## 2023-04-27 DIAGNOSIS — E11.3312: ICD-10-CM

## 2023-04-27 PROBLEM — H04.123 DRY EYE SYNDROME LACRIMAL GLAND; BOTH EYES: Status: ACTIVE | Noted: 2023-04-18

## 2023-04-27 PROCEDURE — 92134 CPTRZ OPH DX IMG PST SGM RTA: CPT | Performed by: OPHTHALMOLOGY

## 2023-04-27 PROCEDURE — 67028 INJECTION EYE DRUG: CPT | Performed by: OPHTHALMOLOGY

## 2023-04-27 ASSESSMENT — KERATOMETRY
OD_K2POWER_DIOPTERS: 41.50
OS_K2POWER_DIOPTERS: 41.25
METHOD_AUTO_MANUAL: AUTO
OD_K1POWER_DIOPTERS: 41.25
OD_AXISANGLE_DEGREES: 179
OS_AXISANGLE_DEGREES: 005
OS_K1POWER_DIOPTERS: 40.50

## 2023-04-27 ASSESSMENT — REFRACTION_AUTOREFRACTION
OD_SPHERE: +4.00
OD_CYLINDER: -1.00
OD_AXIS: 071
OS_SPHERE: UTP

## 2023-04-27 ASSESSMENT — VISUAL ACUITY
OS_BCVA: 20/25-1
OD_BCVA: CF 4FT

## 2023-04-27 ASSESSMENT — PACHYMETRY
OD_CT_UM: 567
OS_CT_CORRECTION: -1
OD_CT_CORRECTION: -1
OS_CT_UM: 555

## 2023-04-27 ASSESSMENT — TONOMETRY
OD_IOP_MMHG: 13
OS_IOP_MMHG: 15

## 2023-04-27 ASSESSMENT — AXIALLENGTH_DERIVED: OD_AL: 23.0229

## 2023-04-27 ASSESSMENT — CONFRONTATIONAL VISUAL FIELD TEST (CVF)
OD_FINDINGS: FULL
OS_FINDINGS: FULL

## 2023-04-27 ASSESSMENT — SPHEQUIV_DERIVED: OD_SPHEQUIV: 3.5

## 2023-05-15 ENCOUNTER — OFFICE (OUTPATIENT)
Dept: URBAN - METROPOLITAN AREA CLINIC 63 | Facility: CLINIC | Age: 73
Setting detail: OPHTHALMOLOGY
End: 2023-05-15
Payer: MEDICARE

## 2023-05-15 DIAGNOSIS — E11.3312: ICD-10-CM

## 2023-05-15 PROCEDURE — 67210 TREATMENT OF RETINAL LESION: CPT | Performed by: OPHTHALMOLOGY

## 2023-05-15 ASSESSMENT — KERATOMETRY
METHOD_AUTO_MANUAL: AUTO
OS_K2POWER_DIOPTERS: 41.25
OD_AXISANGLE_DEGREES: 179
OS_AXISANGLE_DEGREES: 005
OS_K1POWER_DIOPTERS: 40.50
OD_K2POWER_DIOPTERS: 41.50
OD_K1POWER_DIOPTERS: 41.25

## 2023-05-15 ASSESSMENT — PACHYMETRY
OD_CT_CORRECTION: -1
OS_CT_UM: 555
OS_CT_CORRECTION: -1
OD_CT_UM: 567

## 2023-05-15 ASSESSMENT — CONFRONTATIONAL VISUAL FIELD TEST (CVF)
OD_FINDINGS: FULL
OS_FINDINGS: FULL

## 2023-05-15 ASSESSMENT — SPHEQUIV_DERIVED: OD_SPHEQUIV: 3.5

## 2023-05-15 ASSESSMENT — AXIALLENGTH_DERIVED: OD_AL: 23.0229

## 2023-05-15 ASSESSMENT — VISUAL ACUITY
OD_BCVA: 20/250
OS_BCVA: 20/25

## 2023-05-15 ASSESSMENT — REFRACTION_AUTOREFRACTION
OD_CYLINDER: -1.00
OD_SPHERE: +4.00
OS_SPHERE: UTP
OD_AXIS: 071

## 2023-05-15 ASSESSMENT — TONOMETRY: OD_IOP_MMHG: 21

## 2023-05-24 NOTE — ED ADULT TRIAGE NOTE - BP NONINVASIVE DIASTOLIC (MM HG)
73 Detail Level: Detailed Depth Of Biopsy: dermis Was A Bandage Applied: Yes Size Of Lesion In Cm: 0 Biopsy Type: H and E Biopsy Method: Dermablade Anesthesia Type: 1% lidocaine with epinephrine Anesthesia Volume In Cc: 1.5 Hemostasis: Drysol Wound Care: Petrolatum Dressing: Band-Aid Destruction After The Procedure: No Type Of Destruction Used: Electrodesiccation and Curettage Curettage Text: The wound bed was treated with curettage after the biopsy was performed. Cryotherapy Text: The wound bed was treated with cryotherapy after the biopsy was performed. Electrodesiccation Text: The wound bed was treated with electrodesiccation after the biopsy was performed. Electrodesiccation And Curettage Text: The wound bed was treated with electrodesiccation and curettage after the biopsy was performed. Silver Nitrate Text: The wound bed was treated with silver nitrate after the biopsy was performed. Lab: [id number] Lab Facility:  Consent: Verbal consent was obtained and risks were reviewed including but not limited to scarring, infection, bleeding, scabbing, incomplete removal, nerve damage and allergy to anesthesia. Post-Care Instructions: I reviewed with the patient in detail post-care instructions. Patient is to keep the biopsy site dry overnight, and then apply Vaseline twice daily until healed. Patient may apply hydrogen peroxide soaks to remove any crusting. Notification Instructions: Patient will be notified of biopsy results. However, patient instructed to call the office if not contacted within 2 weeks. Billing Type: Third-Party Bill Information: Selecting Yes will display possible errors in your note based on the variables you have selected. This validation is only offered as a suggestion for you. PLEASE NOTE THAT THE VALIDATION TEXT WILL BE REMOVED WHEN YOU FINALIZE YOUR NOTE. IF YOU WANT TO FAX A PRELIMINARY NOTE YOU WILL NEED TO TOGGLE THIS TO 'NO' IF YOU DO NOT WANT IT IN YOUR FAXED NOTE.

## 2023-06-22 ENCOUNTER — OFFICE (OUTPATIENT)
Dept: URBAN - METROPOLITAN AREA CLINIC 110 | Facility: CLINIC | Age: 73
Setting detail: OPHTHALMOLOGY
End: 2023-06-22
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
## 2023-06-22 PROCEDURE — NO SHOW FE NO SHOW FEE: Performed by: OPHTHALMOLOGY

## 2023-07-14 NOTE — ED PROVIDER NOTE - PHYSICAL EXAMINATION
Start gently massaging the R breast surgical site with lotion twice a day for 3 weeks. L breast pain: Voltaren ointment    Breast exam performed, no palpable masses. Continue self breast exams    Healthy Lifestyle Recommendations: healthy diet (decrease consumption of red meat, increase fresh fruits and vegetables), decreased alcohol consumption (less than 4 drinks/week), adequate sleep (goal 6-8 hours), routine exercise (goal 150 minutes/week or greater), weight control.      Return: R breast dx mammogram and office visit in 6 weeks Alert, lucid, and in no apparent distress. Pt is normocephalic, atraumatic.  Pupils are equal, round, no dysmetria. External ear without bleeding or mass, no nasal discharge or malodor, lips pink, moist mucous membranes, tongue midline. Neck supple.   Lungs clear to auscultation. Heart regular rate and rhythm, normal S1, S2, no murmurs, gallops, rubs.  Abdomen is soft, nontender, no pulsatile mass, no masses, no distension, no rebound. No CVA Tenderness, no suprapubic tenderness.   Non-focal sensory, 5 out of 5 motor strength, no dysmetria, fluent, goal directed speech. CN2 to 12 intact. Skin without rash,  No submandibular adenopathy. Normal mentation, does not appear agitated

## 2023-07-17 ENCOUNTER — OFFICE (OUTPATIENT)
Dept: URBAN - METROPOLITAN AREA CLINIC 63 | Facility: CLINIC | Age: 73
Setting detail: OPHTHALMOLOGY
End: 2023-07-17
Payer: MEDICARE

## 2023-07-17 DIAGNOSIS — E11.3313: ICD-10-CM

## 2023-07-17 DIAGNOSIS — H43.821: ICD-10-CM

## 2023-07-17 DIAGNOSIS — H35.033: ICD-10-CM

## 2023-07-17 DIAGNOSIS — H35.372: ICD-10-CM

## 2023-07-17 DIAGNOSIS — E11.3311: ICD-10-CM

## 2023-07-17 PROCEDURE — 67210 TREATMENT OF RETINAL LESION: CPT | Performed by: OPHTHALMOLOGY

## 2023-07-17 PROCEDURE — 92235 FLUORESCEIN ANGRPH MLTIFRAME: CPT | Performed by: OPHTHALMOLOGY

## 2023-07-17 PROCEDURE — 92134 CPTRZ OPH DX IMG PST SGM RTA: CPT | Performed by: OPHTHALMOLOGY

## 2023-07-17 PROCEDURE — 99213 OFFICE O/P EST LOW 20 MIN: CPT | Performed by: OPHTHALMOLOGY

## 2023-07-17 ASSESSMENT — KERATOMETRY
OS_AXISANGLE_DEGREES: 005
OD_K2POWER_DIOPTERS: 41.50
METHOD_AUTO_MANUAL: AUTO
OS_K1POWER_DIOPTERS: 40.50
OS_K2POWER_DIOPTERS: 41.25
OD_K1POWER_DIOPTERS: 41.25
OD_AXISANGLE_DEGREES: 179

## 2023-07-17 ASSESSMENT — VISUAL ACUITY
OS_BCVA: 20/25
OD_BCVA: 20/400

## 2023-07-17 ASSESSMENT — REFRACTION_AUTOREFRACTION
OD_AXIS: 071
OD_SPHERE: +4.00
OD_CYLINDER: -1.00
OS_SPHERE: UTP

## 2023-07-17 ASSESSMENT — SPHEQUIV_DERIVED: OD_SPHEQUIV: 3.5

## 2023-07-17 ASSESSMENT — CONFRONTATIONAL VISUAL FIELD TEST (CVF)
OD_FINDINGS: FULL
OS_FINDINGS: FULL

## 2023-07-17 ASSESSMENT — AXIALLENGTH_DERIVED: OD_AL: 23.0229

## 2023-07-17 ASSESSMENT — TONOMETRY: OS_IOP_MMHG: 19

## 2023-07-17 ASSESSMENT — PACHYMETRY
OD_CT_CORRECTION: -1
OS_CT_CORRECTION: -1
OS_CT_UM: 555
OD_CT_UM: 567

## 2023-07-25 ENCOUNTER — OFFICE (OUTPATIENT)
Dept: URBAN - METROPOLITAN AREA CLINIC 94 | Facility: CLINIC | Age: 73
Setting detail: OPHTHALMOLOGY
End: 2023-07-25
Payer: MEDICARE

## 2023-07-25 DIAGNOSIS — H35.372: ICD-10-CM

## 2023-07-25 DIAGNOSIS — E11.3312: ICD-10-CM

## 2023-07-25 DIAGNOSIS — H35.033: ICD-10-CM

## 2023-07-25 DIAGNOSIS — E11.3313: ICD-10-CM

## 2023-07-25 DIAGNOSIS — H43.821: ICD-10-CM

## 2023-07-25 PROCEDURE — 67028 INJECTION EYE DRUG: CPT | Performed by: OPHTHALMOLOGY

## 2023-07-25 PROCEDURE — 92134 CPTRZ OPH DX IMG PST SGM RTA: CPT | Performed by: OPHTHALMOLOGY

## 2023-07-25 ASSESSMENT — PACHYMETRY
OD_CT_UM: 567
OS_CT_UM: 555
OD_CT_CORRECTION: -1
OS_CT_CORRECTION: -1

## 2023-07-25 ASSESSMENT — REFRACTION_AUTOREFRACTION
OD_SPHERE: +4.00
OD_AXIS: 071
OS_SPHERE: UTP
OD_CYLINDER: -1.00

## 2023-07-25 ASSESSMENT — CONFRONTATIONAL VISUAL FIELD TEST (CVF)
OD_FINDINGS: FULL
OS_FINDINGS: FULL

## 2023-07-25 ASSESSMENT — VISUAL ACUITY
OD_BCVA: 20/CF 3FT
OS_BCVA: 20/30

## 2023-07-25 ASSESSMENT — KERATOMETRY
OD_K1POWER_DIOPTERS: 41.25
OS_AXISANGLE_DEGREES: 005
OS_K2POWER_DIOPTERS: 41.25
METHOD_AUTO_MANUAL: AUTO
OD_AXISANGLE_DEGREES: 179
OD_K2POWER_DIOPTERS: 41.50
OS_K1POWER_DIOPTERS: 40.50

## 2023-07-25 ASSESSMENT — SPHEQUIV_DERIVED: OD_SPHEQUIV: 3.5

## 2023-07-25 ASSESSMENT — AXIALLENGTH_DERIVED: OD_AL: 23.0229

## 2023-08-08 ENCOUNTER — OFFICE (OUTPATIENT)
Dept: URBAN - METROPOLITAN AREA CLINIC 63 | Facility: CLINIC | Age: 73
Setting detail: OPHTHALMOLOGY
End: 2023-08-08
Payer: MEDICARE

## 2023-08-08 DIAGNOSIS — H04.123: ICD-10-CM

## 2023-08-08 DIAGNOSIS — H40.1121: ICD-10-CM

## 2023-08-08 DIAGNOSIS — Z96.1: ICD-10-CM

## 2023-08-08 PROCEDURE — 99214 OFFICE O/P EST MOD 30 MIN: CPT | Performed by: OPHTHALMOLOGY

## 2023-08-08 ASSESSMENT — TONOMETRY: OS_IOP_MMHG: 18

## 2023-08-08 ASSESSMENT — REFRACTION_AUTOREFRACTION
OS_AXIS: 079
OD_CYLINDER: -1.50
OS_CYLINDER: -1.00
OD_AXIS: 075
OD_SPHERE: +4.25
OS_SPHERE: +1.25

## 2023-08-08 ASSESSMENT — KERATOMETRY
OS_K2POWER_DIOPTERS: 41.25
OD_K2POWER_DIOPTERS: 41.50
OS_K1POWER_DIOPTERS: 40.50
OD_AXISANGLE_DEGREES: 179
METHOD_AUTO_MANUAL: AUTO
OS_AXISANGLE_DEGREES: 005
OD_K1POWER_DIOPTERS: 41.25

## 2023-08-08 ASSESSMENT — PACHYMETRY
OD_CT_UM: 567
OS_CT_CORRECTION: -1
OD_CT_CORRECTION: -1
OS_CT_UM: 555

## 2023-08-08 ASSESSMENT — CONFRONTATIONAL VISUAL FIELD TEST (CVF)
OS_FINDINGS: FULL
OD_FINDINGS: FULL

## 2023-08-08 ASSESSMENT — AXIALLENGTH_DERIVED
OS_AL: 24.2836
OD_AL: 23.0229

## 2023-08-08 ASSESSMENT — SPHEQUIV_DERIVED
OS_SPHEQUIV: 0.75
OD_SPHEQUIV: 3.5

## 2023-08-08 ASSESSMENT — VISUAL ACUITY
OS_BCVA: 20/25-1
OD_BCVA: 20/400

## 2023-08-22 ENCOUNTER — OFFICE (OUTPATIENT)
Dept: URBAN - METROPOLITAN AREA CLINIC 63 | Facility: CLINIC | Age: 73
Setting detail: OPHTHALMOLOGY
End: 2023-08-22
Payer: MEDICARE

## 2023-08-22 DIAGNOSIS — H40.1121: ICD-10-CM

## 2023-08-22 DIAGNOSIS — H04.123: ICD-10-CM

## 2023-08-22 DIAGNOSIS — Z96.1: ICD-10-CM

## 2023-08-22 PROCEDURE — 92020 GONIOSCOPY: CPT | Performed by: OPHTHALMOLOGY

## 2023-08-22 PROCEDURE — 99213 OFFICE O/P EST LOW 20 MIN: CPT | Performed by: OPHTHALMOLOGY

## 2023-08-22 ASSESSMENT — AXIALLENGTH_DERIVED
OD_AL: 23.1629
OS_AL: 23.892

## 2023-08-22 ASSESSMENT — REFRACTION_AUTOREFRACTION
OS_SPHERE: +1.75
OS_CYLINDER: -1.25
OD_CYLINDER: -1.25
OD_AXIS: 083
OS_AXIS: 069
OD_SPHERE: +3.75

## 2023-08-22 ASSESSMENT — VISUAL ACUITY
OD_BCVA: 20/400
OS_BCVA: 20/25-1

## 2023-08-22 ASSESSMENT — PACHYMETRY
OS_CT_UM: 555
OD_CT_CORRECTION: -1
OS_CT_CORRECTION: -1
OD_CT_UM: 567

## 2023-08-22 ASSESSMENT — CONFRONTATIONAL VISUAL FIELD TEST (CVF)
OD_FINDINGS: FULL
OS_FINDINGS: FULL

## 2023-08-22 ASSESSMENT — SPHEQUIV_DERIVED
OD_SPHEQUIV: 3.125
OS_SPHEQUIV: 1.125

## 2023-08-22 ASSESSMENT — KERATOMETRY
OS_K2POWER_DIOPTERS: 42.00
OD_K1POWER_DIOPTERS: 41.25
OD_AXISANGLE_DEGREES: 152
OS_K1POWER_DIOPTERS: 41.00
OD_K2POWER_DIOPTERS: 41.50
METHOD_AUTO_MANUAL: AUTO
OS_AXISANGLE_DEGREES: 179

## 2023-08-22 ASSESSMENT — TONOMETRY
OS_IOP_MMHG: 15
OD_IOP_MMHG: 18

## 2023-09-30 ENCOUNTER — OFFICE (OUTPATIENT)
Dept: URBAN - METROPOLITAN AREA CLINIC 63 | Facility: CLINIC | Age: 73
Setting detail: OPHTHALMOLOGY
End: 2023-09-30
Payer: MEDICARE

## 2023-09-30 DIAGNOSIS — E11.3312: ICD-10-CM

## 2023-09-30 PROCEDURE — 67210 TREATMENT OF RETINAL LESION: CPT | Performed by: OPHTHALMOLOGY

## 2023-09-30 ASSESSMENT — REFRACTION_AUTOREFRACTION
OD_SPHERE: +3.75
OS_CYLINDER: -1.25
OD_AXIS: 083
OS_AXIS: 069
OD_CYLINDER: -1.25
OS_SPHERE: +1.75

## 2023-09-30 ASSESSMENT — VISUAL ACUITY
OS_BCVA: 20/30+1
OD_BCVA: 20/400

## 2023-09-30 ASSESSMENT — PACHYMETRY
OS_CT_CORRECTION: -1
OD_CT_CORRECTION: -1
OS_CT_UM: 555
OD_CT_UM: 567

## 2023-09-30 ASSESSMENT — CONFRONTATIONAL VISUAL FIELD TEST (CVF)
OD_FINDINGS: FULL
OS_FINDINGS: FULL

## 2023-09-30 ASSESSMENT — TONOMETRY: OS_IOP_MMHG: 17

## 2023-09-30 ASSESSMENT — SPHEQUIV_DERIVED
OD_SPHEQUIV: 3.125
OS_SPHEQUIV: 1.125

## 2023-12-12 ENCOUNTER — OFFICE (OUTPATIENT)
Dept: URBAN - METROPOLITAN AREA CLINIC 94 | Facility: CLINIC | Age: 73
Setting detail: OPHTHALMOLOGY
End: 2023-12-12
Payer: MEDICARE

## 2023-12-12 ENCOUNTER — RX ONLY (RX ONLY)
Age: 73
End: 2023-12-12

## 2023-12-12 DIAGNOSIS — E11.3313: ICD-10-CM

## 2023-12-12 DIAGNOSIS — E11.3312: ICD-10-CM

## 2023-12-12 PROCEDURE — 92134 CPTRZ OPH DX IMG PST SGM RTA: CPT | Performed by: OPHTHALMOLOGY

## 2023-12-12 PROCEDURE — 67028 INJECTION EYE DRUG: CPT | Mod: 58,LT | Performed by: OPHTHALMOLOGY

## 2023-12-12 ASSESSMENT — REFRACTION_AUTOREFRACTION
OS_SPHERE: +1.75
OS_AXIS: 069
OD_SPHERE: +3.75
OD_CYLINDER: -1.25
OD_AXIS: 083
OS_CYLINDER: -1.25

## 2023-12-12 ASSESSMENT — SPHEQUIV_DERIVED
OD_SPHEQUIV: 3.125
OS_SPHEQUIV: 1.125

## 2023-12-18 ENCOUNTER — OFFICE (OUTPATIENT)
Dept: URBAN - METROPOLITAN AREA CLINIC 63 | Facility: CLINIC | Age: 73
Setting detail: OPHTHALMOLOGY
End: 2023-12-18
Payer: MEDICARE

## 2023-12-18 DIAGNOSIS — E11.3311: ICD-10-CM

## 2023-12-18 PROCEDURE — 67210 TREATMENT OF RETINAL LESION: CPT | Mod: 79,RT | Performed by: OPHTHALMOLOGY

## 2023-12-18 ASSESSMENT — REFRACTION_AUTOREFRACTION
OS_SPHERE: +1.75
OS_CYLINDER: -1.25
OD_AXIS: 083
OS_AXIS: 069
OD_SPHERE: +3.75
OD_CYLINDER: -1.25

## 2023-12-18 ASSESSMENT — SPHEQUIV_DERIVED
OS_SPHEQUIV: 1.125
OD_SPHEQUIV: 3.125

## 2023-12-18 ASSESSMENT — CONFRONTATIONAL VISUAL FIELD TEST (CVF)
OS_FINDINGS: FULL
OD_FINDINGS: FULL

## 2024-01-03 ENCOUNTER — APPOINTMENT (OUTPATIENT)
Dept: MAMMOGRAPHY | Facility: CLINIC | Age: 74
End: 2024-01-03
Payer: MEDICARE

## 2024-01-03 ENCOUNTER — APPOINTMENT (OUTPATIENT)
Dept: ULTRASOUND IMAGING | Facility: CLINIC | Age: 74
End: 2024-01-03
Payer: MEDICARE

## 2024-01-03 PROCEDURE — 76641 ULTRASOUND BREAST COMPLETE: CPT | Mod: 50,GY

## 2024-01-03 PROCEDURE — 77063 BREAST TOMOSYNTHESIS BI: CPT

## 2024-01-03 PROCEDURE — 77067 SCR MAMMO BI INCL CAD: CPT

## 2024-01-16 ENCOUNTER — OFFICE (OUTPATIENT)
Dept: URBAN - METROPOLITAN AREA CLINIC 63 | Facility: CLINIC | Age: 74
Setting detail: OPHTHALMOLOGY
End: 2024-01-16
Payer: MEDICARE

## 2024-01-16 DIAGNOSIS — H40.1121: ICD-10-CM

## 2024-01-16 DIAGNOSIS — Z96.1: ICD-10-CM

## 2024-01-16 DIAGNOSIS — H04.123: ICD-10-CM

## 2024-01-16 PROCEDURE — 92012 INTRM OPH EXAM EST PATIENT: CPT | Mod: 24 | Performed by: OPHTHALMOLOGY

## 2024-01-16 PROCEDURE — 92250 FUNDUS PHOTOGRAPHY W/I&R: CPT | Performed by: OPHTHALMOLOGY

## 2024-01-16 ASSESSMENT — SPHEQUIV_DERIVED: OD_SPHEQUIV: 2.125

## 2024-01-16 ASSESSMENT — CONFRONTATIONAL VISUAL FIELD TEST (CVF)
OD_FINDINGS: FULL
OS_FINDINGS: FULL

## 2024-01-16 ASSESSMENT — REFRACTION_AUTOREFRACTION
OD_AXIS: 076
OS_SPHERE: UTP
OD_CYLINDER: -1.75
OD_SPHERE: +3.00

## 2024-01-25 ENCOUNTER — OFFICE (OUTPATIENT)
Dept: URBAN - METROPOLITAN AREA CLINIC 63 | Facility: CLINIC | Age: 74
Setting detail: OPHTHALMOLOGY
End: 2024-01-25
Payer: MEDICARE

## 2024-01-25 DIAGNOSIS — E11.3312: ICD-10-CM

## 2024-01-25 DIAGNOSIS — E11.3313: ICD-10-CM

## 2024-01-25 PROCEDURE — 67210 TREATMENT OF RETINAL LESION: CPT | Mod: 79,LT | Performed by: OPHTHALMOLOGY

## 2024-01-25 PROCEDURE — 92134 CPTRZ OPH DX IMG PST SGM RTA: CPT | Performed by: OPHTHALMOLOGY

## 2024-01-25 ASSESSMENT — CONFRONTATIONAL VISUAL FIELD TEST (CVF)
OD_FINDINGS: FULL
OS_FINDINGS: FULL

## 2024-01-25 ASSESSMENT — REFRACTION_AUTOREFRACTION
OD_SPHERE: +3.00
OS_SPHERE: UTP
OD_CYLINDER: -1.75
OD_AXIS: 076

## 2024-01-25 ASSESSMENT — SPHEQUIV_DERIVED: OD_SPHEQUIV: 2.125

## 2024-02-02 ENCOUNTER — APPOINTMENT (OUTPATIENT)
Dept: NUCLEAR MEDICINE | Facility: IMAGING CENTER | Age: 74
End: 2024-02-02
Payer: MEDICARE

## 2024-02-02 ENCOUNTER — OUTPATIENT (OUTPATIENT)
Dept: OUTPATIENT SERVICES | Facility: HOSPITAL | Age: 74
LOS: 1 days | End: 2024-02-02
Payer: MEDICARE

## 2024-02-02 DIAGNOSIS — Z00.8 ENCOUNTER FOR OTHER GENERAL EXAMINATION: ICD-10-CM

## 2024-02-02 DIAGNOSIS — C69.82: ICD-10-CM

## 2024-02-02 DIAGNOSIS — C44.99 OTHER SPECIFIED MALIGNANT NEOPLASM OF SKIN, UNSPECIFIED: ICD-10-CM

## 2024-02-02 DIAGNOSIS — Z98.890 OTHER SPECIFIED POSTPROCEDURAL STATES: Chronic | ICD-10-CM

## 2024-02-02 DIAGNOSIS — Z98.89 OTHER SPECIFIED POSTPROCEDURAL STATES: Chronic | ICD-10-CM

## 2024-02-02 DIAGNOSIS — Z87.19 PERSONAL HISTORY OF OTHER DISEASES OF THE DIGESTIVE SYSTEM: ICD-10-CM

## 2024-02-02 DIAGNOSIS — R59.1 GENERALIZED ENLARGED LYMPH NODES: ICD-10-CM

## 2024-02-02 DIAGNOSIS — Z90.710 ACQUIRED ABSENCE OF BOTH CERVIX AND UTERUS: Chronic | ICD-10-CM

## 2024-02-02 DIAGNOSIS — Z90.49 ACQUIRED ABSENCE OF OTHER SPECIFIED PARTS OF DIGESTIVE TRACT: Chronic | ICD-10-CM

## 2024-02-02 PROCEDURE — 78816 PET IMAGE W/CT FULL BODY: CPT | Mod: MH

## 2024-02-02 PROCEDURE — 71250 CT THORAX DX C-: CPT | Mod: 26,MH

## 2024-02-02 PROCEDURE — 78816 PET IMAGE W/CT FULL BODY: CPT | Mod: 26,PS,KX,MH

## 2024-02-02 PROCEDURE — 71250 CT THORAX DX C-: CPT | Mod: MH

## 2024-02-02 PROCEDURE — A9552: CPT

## 2024-02-19 ENCOUNTER — OFFICE (OUTPATIENT)
Dept: URBAN - METROPOLITAN AREA CLINIC 63 | Facility: CLINIC | Age: 74
Setting detail: OPHTHALMOLOGY
End: 2024-02-19
Payer: MEDICARE

## 2024-02-19 DIAGNOSIS — H35.372: ICD-10-CM

## 2024-02-19 DIAGNOSIS — E11.3311: ICD-10-CM

## 2024-02-19 DIAGNOSIS — H35.033: ICD-10-CM

## 2024-02-19 DIAGNOSIS — E11.3313: ICD-10-CM

## 2024-02-19 PROCEDURE — 67228 TREATMENT X10SV RETINOPATHY: CPT | Mod: 78,RT | Performed by: OPHTHALMOLOGY

## 2024-02-19 PROCEDURE — 92134 CPTRZ OPH DX IMG PST SGM RTA: CPT | Performed by: OPHTHALMOLOGY

## 2024-02-19 ASSESSMENT — SPHEQUIV_DERIVED: OD_SPHEQUIV: 2.125

## 2024-02-19 ASSESSMENT — REFRACTION_AUTOREFRACTION
OS_SPHERE: UTP
OD_AXIS: 076
OD_CYLINDER: -1.75
OD_SPHERE: +3.00

## 2024-02-19 ASSESSMENT — CONFRONTATIONAL VISUAL FIELD TEST (CVF)
OS_FINDINGS: FULL
OD_FINDINGS: FULL

## 2024-03-20 ENCOUNTER — OFFICE (OUTPATIENT)
Dept: URBAN - METROPOLITAN AREA CLINIC 112 | Facility: CLINIC | Age: 74
Setting detail: OPHTHALMOLOGY
End: 2024-03-20
Payer: MEDICARE

## 2024-03-20 DIAGNOSIS — H35.033: ICD-10-CM

## 2024-03-20 DIAGNOSIS — E11.3313: ICD-10-CM

## 2024-03-20 DIAGNOSIS — H35.372: ICD-10-CM

## 2024-03-20 DIAGNOSIS — E11.3311: ICD-10-CM

## 2024-03-20 DIAGNOSIS — C44.1392: ICD-10-CM

## 2024-03-20 DIAGNOSIS — E11.3312: ICD-10-CM

## 2024-03-20 DIAGNOSIS — H43.821: ICD-10-CM

## 2024-03-20 DIAGNOSIS — H40.1121: ICD-10-CM

## 2024-03-20 PROBLEM — H25.11 AGE RELATED NUCLEAR CATARACT; RIGHT EYE: Status: ACTIVE | Noted: 2024-03-20

## 2024-03-20 PROCEDURE — 99213 OFFICE O/P EST LOW 20 MIN: CPT | Performed by: OPHTHALMOLOGY

## 2024-03-20 PROCEDURE — 92134 CPTRZ OPH DX IMG PST SGM RTA: CPT | Performed by: OPHTHALMOLOGY

## 2024-04-09 ENCOUNTER — OFFICE (OUTPATIENT)
Dept: URBAN - METROPOLITAN AREA CLINIC 63 | Facility: CLINIC | Age: 74
Setting detail: OPHTHALMOLOGY
End: 2024-04-09
Payer: MEDICARE

## 2024-04-09 DIAGNOSIS — H25.13: ICD-10-CM

## 2024-04-09 DIAGNOSIS — H40.1131: ICD-10-CM

## 2024-04-09 DIAGNOSIS — H25.11: ICD-10-CM

## 2024-04-09 DIAGNOSIS — H25.041: ICD-10-CM

## 2024-04-09 PROCEDURE — 92136 OPHTHALMIC BIOMETRY: CPT | Mod: TC | Performed by: OPHTHALMOLOGY

## 2024-04-09 PROCEDURE — 92136 OPHTHALMIC BIOMETRY: CPT | Mod: 26,RT | Performed by: OPHTHALMOLOGY

## 2024-04-09 PROCEDURE — 99214 OFFICE O/P EST MOD 30 MIN: CPT | Performed by: OPHTHALMOLOGY

## 2024-04-09 PROCEDURE — 92133 CPTRZD OPH DX IMG PST SGM ON: CPT | Performed by: OPHTHALMOLOGY

## 2024-04-15 ENCOUNTER — OFFICE (OUTPATIENT)
Dept: URBAN - METROPOLITAN AREA CLINIC 63 | Facility: CLINIC | Age: 74
Setting detail: OPHTHALMOLOGY
End: 2024-04-15
Payer: MEDICARE

## 2024-04-15 DIAGNOSIS — H35.033: ICD-10-CM

## 2024-04-15 DIAGNOSIS — E11.3311: ICD-10-CM

## 2024-04-15 PROCEDURE — 92134 CPTRZ OPH DX IMG PST SGM RTA: CPT | Performed by: OPHTHALMOLOGY

## 2024-04-15 PROCEDURE — 67028 INJECTION EYE DRUG: CPT | Mod: 79,RT | Performed by: OPHTHALMOLOGY

## 2024-04-25 ENCOUNTER — OFFICE (OUTPATIENT)
Dept: URBAN - METROPOLITAN AREA CLINIC 63 | Facility: CLINIC | Age: 74
Setting detail: OPHTHALMOLOGY
End: 2024-04-25
Payer: MEDICARE

## 2024-04-25 DIAGNOSIS — H02.825: ICD-10-CM

## 2024-04-25 PROCEDURE — 92285 EXTERNAL OCULAR PHOTOGRAPHY: CPT | Performed by: OPHTHALMOLOGY

## 2024-04-25 PROCEDURE — 99213 OFFICE O/P EST LOW 20 MIN: CPT | Performed by: OPHTHALMOLOGY

## 2024-04-29 ENCOUNTER — OFFICE (OUTPATIENT)
Dept: URBAN - METROPOLITAN AREA CLINIC 63 | Facility: CLINIC | Age: 74
Setting detail: OPHTHALMOLOGY
End: 2024-04-29
Payer: MEDICARE

## 2024-04-29 DIAGNOSIS — E11.3311: ICD-10-CM

## 2024-04-29 PROBLEM — H40.1131 POAG; BOTH EYES MILD: Status: ACTIVE | Noted: 2024-04-09

## 2024-04-29 PROBLEM — H02.825 LESION; LEFT LOWER LID: Status: ACTIVE | Noted: 2024-04-15

## 2024-04-29 PROBLEM — H25.041 POSTERIOR SUBCAPSULAR CATARACT 366.14; RIGHT EYE: Status: ACTIVE | Noted: 2024-04-09

## 2024-04-29 PROCEDURE — 67210 TREATMENT OF RETINAL LESION: CPT | Mod: RT | Performed by: OPHTHALMOLOGY

## 2024-05-03 ENCOUNTER — ASC (OUTPATIENT)
Dept: URBAN - METROPOLITAN AREA SURGERY 8 | Facility: SURGERY | Age: 74
Setting detail: OPHTHALMOLOGY
End: 2024-05-03
Payer: MEDICARE

## 2024-05-03 DIAGNOSIS — H25.11: ICD-10-CM

## 2024-05-03 DIAGNOSIS — H40.1113: ICD-10-CM

## 2024-05-03 DIAGNOSIS — H52.211: ICD-10-CM

## 2024-05-03 PROCEDURE — 66991 XCAPSL CTRC RMVL INSJ 1+: CPT | Mod: 79,RT | Performed by: OPHTHALMOLOGY

## 2024-05-03 PROCEDURE — 68841 INSJ RX ELUT IMPLT LAC CANAL: CPT | Mod: 79,RT | Performed by: OPHTHALMOLOGY

## 2024-05-03 PROCEDURE — A9270 NON-COVERED ITEM OR SERVICE: HCPCS | Mod: GY | Performed by: OPHTHALMOLOGY

## 2024-05-03 PROCEDURE — 65820 GONIOTOMY: CPT | Mod: 79,RT | Performed by: OPHTHALMOLOGY

## 2024-05-03 PROCEDURE — FEMTO FEMTOSECOND LASER: Mod: GY | Performed by: OPHTHALMOLOGY

## 2024-05-04 ENCOUNTER — RX ONLY (RX ONLY)
Age: 74
End: 2024-05-04

## 2024-05-04 ENCOUNTER — OFFICE (OUTPATIENT)
Dept: URBAN - METROPOLITAN AREA CLINIC 112 | Facility: CLINIC | Age: 74
Setting detail: OPHTHALMOLOGY
End: 2024-05-04
Payer: MEDICARE

## 2024-05-04 DIAGNOSIS — Z96.1: ICD-10-CM

## 2024-05-04 DIAGNOSIS — H40.1131: ICD-10-CM

## 2024-05-04 PROCEDURE — 99024 POSTOP FOLLOW-UP VISIT: CPT | Performed by: OPHTHALMOLOGY

## 2024-05-04 ASSESSMENT — CONFRONTATIONAL VISUAL FIELD TEST (CVF)
OS_FINDINGS: FULL
OD_FINDINGS: FULL

## 2024-05-10 ENCOUNTER — OFFICE (OUTPATIENT)
Dept: URBAN - METROPOLITAN AREA CLINIC 112 | Facility: CLINIC | Age: 74
Setting detail: OPHTHALMOLOGY
End: 2024-05-10
Payer: MEDICARE

## 2024-05-10 DIAGNOSIS — Z96.1: ICD-10-CM

## 2024-05-10 DIAGNOSIS — H40.1131: ICD-10-CM

## 2024-05-10 PROCEDURE — 92012 INTRM OPH EXAM EST PATIENT: CPT | Mod: 24 | Performed by: OPHTHALMOLOGY

## 2024-05-10 ASSESSMENT — CONFRONTATIONAL VISUAL FIELD TEST (CVF)
OS_FINDINGS: FULL
OD_FINDINGS: FULL

## 2024-05-21 ENCOUNTER — OFFICE (OUTPATIENT)
Dept: URBAN - METROPOLITAN AREA CLINIC 63 | Facility: CLINIC | Age: 74
Setting detail: OPHTHALMOLOGY
End: 2024-05-21
Payer: MEDICARE

## 2024-05-21 DIAGNOSIS — Z96.1: ICD-10-CM

## 2024-05-21 DIAGNOSIS — H40.1131: ICD-10-CM

## 2024-05-21 PROCEDURE — 99024 POSTOP FOLLOW-UP VISIT: CPT | Performed by: OPHTHALMOLOGY

## 2024-05-21 ASSESSMENT — CONFRONTATIONAL VISUAL FIELD TEST (CVF)
OD_FINDINGS: FULL
OS_FINDINGS: FULL

## 2024-06-17 ENCOUNTER — OFFICE (OUTPATIENT)
Dept: URBAN - METROPOLITAN AREA CLINIC 63 | Facility: CLINIC | Age: 74
Setting detail: OPHTHALMOLOGY
End: 2024-06-17
Payer: MEDICARE

## 2024-06-17 DIAGNOSIS — E11.3312: ICD-10-CM

## 2024-06-17 PROCEDURE — 67210 TREATMENT OF RETINAL LESION: CPT | Mod: 79,LT | Performed by: OPHTHALMOLOGY

## 2024-06-17 ASSESSMENT — CONFRONTATIONAL VISUAL FIELD TEST (CVF)
OD_FINDINGS: FULL
OS_FINDINGS: FULL

## 2024-07-09 ENCOUNTER — OFFICE (OUTPATIENT)
Dept: URBAN - METROPOLITAN AREA CLINIC 63 | Facility: CLINIC | Age: 74
Setting detail: OPHTHALMOLOGY
End: 2024-07-09
Payer: MEDICARE

## 2024-07-09 DIAGNOSIS — H40.1131: ICD-10-CM

## 2024-07-09 PROCEDURE — 99024 POSTOP FOLLOW-UP VISIT: CPT | Performed by: OPHTHALMOLOGY

## 2024-07-09 ASSESSMENT — CONFRONTATIONAL VISUAL FIELD TEST (CVF)
OS_FINDINGS: FULL
OD_FINDINGS: FULL

## 2024-07-15 ENCOUNTER — OFFICE (OUTPATIENT)
Dept: URBAN - METROPOLITAN AREA CLINIC 63 | Facility: CLINIC | Age: 74
Setting detail: OPHTHALMOLOGY
End: 2024-07-15
Payer: MEDICARE

## 2024-07-15 DIAGNOSIS — H35.372: ICD-10-CM

## 2024-07-15 DIAGNOSIS — H35.033: ICD-10-CM

## 2024-07-15 DIAGNOSIS — E11.3312: ICD-10-CM

## 2024-07-15 PROCEDURE — 67028 INJECTION EYE DRUG: CPT | Mod: 79,LT | Performed by: OPHTHALMOLOGY

## 2024-07-15 PROCEDURE — 92134 CPTRZ OPH DX IMG PST SGM RTA: CPT | Performed by: OPHTHALMOLOGY

## 2024-07-15 ASSESSMENT — CONFRONTATIONAL VISUAL FIELD TEST (CVF)
OS_FINDINGS: FULL
OD_FINDINGS: FULL

## 2024-08-22 ENCOUNTER — OFFICE (OUTPATIENT)
Dept: URBAN - METROPOLITAN AREA CLINIC 63 | Facility: CLINIC | Age: 74
Setting detail: OPHTHALMOLOGY
End: 2024-08-22
Payer: MEDICARE

## 2024-08-22 DIAGNOSIS — E11.3311: ICD-10-CM

## 2024-08-22 PROCEDURE — 67210 TREATMENT OF RETINAL LESION: CPT | Mod: 79,RT | Performed by: OPHTHALMOLOGY

## 2024-08-22 ASSESSMENT — CONFRONTATIONAL VISUAL FIELD TEST (CVF)
OD_FINDINGS: FULL
OS_FINDINGS: FULL

## 2024-09-30 ENCOUNTER — OFFICE (OUTPATIENT)
Dept: URBAN - METROPOLITAN AREA CLINIC 63 | Facility: CLINIC | Age: 74
Setting detail: OPHTHALMOLOGY
End: 2024-09-30
Payer: MEDICARE

## 2024-09-30 DIAGNOSIS — E11.3312: ICD-10-CM

## 2024-09-30 PROCEDURE — 67210 TREATMENT OF RETINAL LESION: CPT | Mod: 79,LT | Performed by: OPHTHALMOLOGY

## 2024-10-22 ENCOUNTER — APPOINTMENT (OUTPATIENT)
Dept: CT IMAGING | Facility: CLINIC | Age: 74
End: 2024-10-22

## 2024-10-22 PROCEDURE — 70480 CT ORBIT/EAR/FOSSA W/O DYE: CPT | Mod: MH

## 2024-12-10 ENCOUNTER — OFFICE (OUTPATIENT)
Dept: URBAN - METROPOLITAN AREA CLINIC 63 | Facility: CLINIC | Age: 74
Setting detail: OPHTHALMOLOGY
End: 2024-12-10
Payer: MEDICARE

## 2024-12-10 DIAGNOSIS — H40.1131: ICD-10-CM

## 2024-12-10 DIAGNOSIS — Z96.1: ICD-10-CM

## 2024-12-10 PROCEDURE — 92083 EXTENDED VISUAL FIELD XM: CPT | Performed by: OPHTHALMOLOGY

## 2024-12-10 PROCEDURE — 92014 COMPRE OPH EXAM EST PT 1/>: CPT | Mod: 24 | Performed by: OPHTHALMOLOGY

## 2024-12-10 ASSESSMENT — PACHYMETRY
OS_CT_CORRECTION: -1
OD_CT_UM: 567
OD_CT_CORRECTION: -1
OS_CT_UM: 555

## 2024-12-10 ASSESSMENT — KERATOMETRY
OD_K2POWER_DIOPTERS: 41.25
OD_AXISANGLE_DEGREES: 172
METHOD_AUTO_MANUAL: AUTO
OD_K1POWER_DIOPTERS: 40.00

## 2024-12-10 ASSESSMENT — REFRACTION_AUTOREFRACTION
OD_AXIS: 080
OD_CYLINDER: -1.00
OD_SPHERE: +0.25

## 2024-12-10 ASSESSMENT — TONOMETRY: OD_IOP_MMHG: 16

## 2024-12-10 ASSESSMENT — VISUAL ACUITY
OS_BCVA: 20/20-1
OD_BCVA: CF@2FT

## 2024-12-10 ASSESSMENT — CONFRONTATIONAL VISUAL FIELD TEST (CVF)
OD_FINDINGS: FULL
OS_FINDINGS: FULL

## 2024-12-16 ENCOUNTER — OFFICE (OUTPATIENT)
Dept: URBAN - METROPOLITAN AREA CLINIC 63 | Facility: CLINIC | Age: 74
Setting detail: OPHTHALMOLOGY
End: 2024-12-16
Payer: MEDICARE

## 2024-12-16 ENCOUNTER — RX ONLY (RX ONLY)
Age: 74
End: 2024-12-16

## 2024-12-16 DIAGNOSIS — E11.3313: ICD-10-CM

## 2024-12-16 PROCEDURE — 92134 CPTRZ OPH DX IMG PST SGM RTA: CPT | Performed by: OPHTHALMOLOGY

## 2024-12-16 PROCEDURE — 67028 INJECTION EYE DRUG: CPT | Mod: 58,50 | Performed by: OPHTHALMOLOGY

## 2024-12-16 ASSESSMENT — KERATOMETRY
OD_AXISANGLE_DEGREES: 172
METHOD_AUTO_MANUAL: AUTO
OD_K2POWER_DIOPTERS: 41.25
OD_K1POWER_DIOPTERS: 40.00

## 2024-12-16 ASSESSMENT — REFRACTION_AUTOREFRACTION
OD_AXIS: 080
OD_CYLINDER: -1.00
OD_SPHERE: +0.25

## 2024-12-16 ASSESSMENT — PACHYMETRY
OS_CT_UM: 555
OS_CT_CORRECTION: -1
OD_CT_CORRECTION: -1
OD_CT_UM: 567

## 2024-12-16 ASSESSMENT — VISUAL ACUITY
OD_BCVA: CF@1FT
OS_BCVA: 20/20

## 2024-12-16 ASSESSMENT — CONFRONTATIONAL VISUAL FIELD TEST (CVF)
OS_FINDINGS: FULL
OD_FINDINGS: FULL

## 2024-12-26 ENCOUNTER — OFFICE (OUTPATIENT)
Dept: URBAN - METROPOLITAN AREA CLINIC 63 | Facility: CLINIC | Age: 74
Setting detail: OPHTHALMOLOGY
End: 2024-12-26
Payer: MEDICARE

## 2024-12-26 DIAGNOSIS — E11.3311: ICD-10-CM

## 2024-12-26 PROCEDURE — 67210 TREATMENT OF RETINAL LESION: CPT | Mod: 79,RT | Performed by: OPHTHALMOLOGY

## 2024-12-26 ASSESSMENT — REFRACTION_AUTOREFRACTION
OD_SPHERE: +0.25
OD_AXIS: 080
OD_CYLINDER: -1.00

## 2024-12-26 ASSESSMENT — KERATOMETRY
METHOD_AUTO_MANUAL: AUTO
OD_AXISANGLE_DEGREES: 172
OD_K1POWER_DIOPTERS: 40.00
OD_K2POWER_DIOPTERS: 41.25

## 2024-12-26 ASSESSMENT — VISUAL ACUITY
OD_BCVA: CF@1FT
OS_BCVA: 20/20

## 2024-12-26 ASSESSMENT — PACHYMETRY
OS_CT_UM: 555
OD_CT_UM: 567
OS_CT_CORRECTION: -1
OD_CT_CORRECTION: -1

## 2024-12-26 ASSESSMENT — CONFRONTATIONAL VISUAL FIELD TEST (CVF)
OD_FINDINGS: FULL
OS_FINDINGS: FULL

## 2025-01-14 ENCOUNTER — OFFICE (OUTPATIENT)
Dept: URBAN - METROPOLITAN AREA CLINIC 63 | Facility: CLINIC | Age: 75
Setting detail: OPHTHALMOLOGY
End: 2025-01-14
Payer: MEDICARE

## 2025-01-14 DIAGNOSIS — H40.1131: ICD-10-CM

## 2025-01-14 DIAGNOSIS — Z96.1: ICD-10-CM

## 2025-01-14 PROCEDURE — 92250 FUNDUS PHOTOGRAPHY W/I&R: CPT | Performed by: OPHTHALMOLOGY

## 2025-01-14 PROCEDURE — 92012 INTRM OPH EXAM EST PATIENT: CPT | Mod: 24 | Performed by: OPHTHALMOLOGY

## 2025-01-14 ASSESSMENT — PACHYMETRY
OD_CT_UM: 567
OS_CT_UM: 555
OD_CT_CORRECTION: -1
OS_CT_CORRECTION: -1

## 2025-01-14 ASSESSMENT — REFRACTION_AUTOREFRACTION
OD_AXIS: 080
OD_CYLINDER: -1.00
OD_SPHERE: +0.25

## 2025-01-14 ASSESSMENT — TONOMETRY
OS_IOP_MMHG: 15
OD_IOP_MMHG: 18

## 2025-01-14 ASSESSMENT — CONFRONTATIONAL VISUAL FIELD TEST (CVF)
OS_FINDINGS: FULL
OD_FINDINGS: FULL

## 2025-01-14 ASSESSMENT — VISUAL ACUITY
OS_BCVA: 20/20
OD_BCVA: CF@1FT

## 2025-01-14 ASSESSMENT — KERATOMETRY
OD_K2POWER_DIOPTERS: 41.25
OD_AXISANGLE_DEGREES: 172
OD_K1POWER_DIOPTERS: 40.00
METHOD_AUTO_MANUAL: AUTO

## 2025-02-26 ENCOUNTER — ASC (OUTPATIENT)
Dept: URBAN - METROPOLITAN AREA SURGERY 8 | Facility: SURGERY | Age: 75
Setting detail: OPHTHALMOLOGY
End: 2025-02-26
Payer: MEDICARE

## 2025-02-26 DIAGNOSIS — E11.3312: ICD-10-CM

## 2025-02-26 PROCEDURE — 67210 TREATMENT OF RETINAL LESION: CPT | Mod: 79,LT | Performed by: OPHTHALMOLOGY

## 2025-02-26 ASSESSMENT — REFRACTION_AUTOREFRACTION
OD_AXIS: 080
OD_CYLINDER: -1.00
OD_SPHERE: +0.25

## 2025-02-26 ASSESSMENT — CONFRONTATIONAL VISUAL FIELD TEST (CVF)
OS_FINDINGS: FULL
OD_FINDINGS: FULL

## 2025-02-26 ASSESSMENT — VISUAL ACUITY
OD_BCVA: CF@1FT
OS_BCVA: 20/20

## 2025-02-26 ASSESSMENT — PACHYMETRY
OS_CT_CORRECTION: -1
OD_CT_UM: 567
OD_CT_CORRECTION: -1
OS_CT_UM: 555

## 2025-02-26 ASSESSMENT — KERATOMETRY
OD_AXISANGLE_DEGREES: 172
OD_K2POWER_DIOPTERS: 41.25
METHOD_AUTO_MANUAL: AUTO
OD_K1POWER_DIOPTERS: 40.00

## 2025-03-15 ENCOUNTER — APPOINTMENT (OUTPATIENT)
Dept: NUCLEAR MEDICINE | Facility: IMAGING CENTER | Age: 75
End: 2025-03-15

## 2025-03-15 ENCOUNTER — OUTPATIENT (OUTPATIENT)
Dept: OUTPATIENT SERVICES | Facility: HOSPITAL | Age: 75
LOS: 1 days | End: 2025-03-15
Payer: MEDICARE

## 2025-03-15 DIAGNOSIS — C69.82: ICD-10-CM

## 2025-03-15 DIAGNOSIS — Z90.710 ACQUIRED ABSENCE OF BOTH CERVIX AND UTERUS: Chronic | ICD-10-CM

## 2025-03-15 DIAGNOSIS — Z90.49 ACQUIRED ABSENCE OF OTHER SPECIFIED PARTS OF DIGESTIVE TRACT: Chronic | ICD-10-CM

## 2025-03-15 DIAGNOSIS — C44.99 OTHER SPECIFIED MALIGNANT NEOPLASM OF SKIN, UNSPECIFIED: ICD-10-CM

## 2025-03-15 DIAGNOSIS — Z00.8 ENCOUNTER FOR OTHER GENERAL EXAMINATION: ICD-10-CM

## 2025-03-15 DIAGNOSIS — Z98.89 OTHER SPECIFIED POSTPROCEDURAL STATES: Chronic | ICD-10-CM

## 2025-03-15 DIAGNOSIS — Z98.890 OTHER SPECIFIED POSTPROCEDURAL STATES: Chronic | ICD-10-CM

## 2025-03-15 PROCEDURE — 78816 PET IMAGE W/CT FULL BODY: CPT | Mod: 26,PS

## 2025-03-15 PROCEDURE — 78816 PET IMAGE W/CT FULL BODY: CPT | Mod: MH

## 2025-03-15 PROCEDURE — A9552: CPT

## 2025-07-21 ENCOUNTER — OFFICE (OUTPATIENT)
Dept: URBAN - METROPOLITAN AREA CLINIC 63 | Facility: CLINIC | Age: 75
Setting detail: OPHTHALMOLOGY
End: 2025-07-21
Payer: MEDICARE

## 2025-07-21 DIAGNOSIS — E11.3313: ICD-10-CM

## 2025-07-21 PROCEDURE — 92134 CPTRZ OPH DX IMG PST SGM RTA: CPT | Performed by: OPHTHALMOLOGY

## 2025-07-21 PROCEDURE — 67028 INJECTION EYE DRUG: CPT | Mod: 50 | Performed by: OPHTHALMOLOGY

## 2025-07-21 ASSESSMENT — PACHYMETRY
OD_CT_CORRECTION: -1
OS_CT_UM: 555
OD_CT_UM: 567
OS_CT_CORRECTION: -1

## 2025-07-21 ASSESSMENT — REFRACTION_AUTOREFRACTION
OD_AXIS: 080
OD_CYLINDER: -1.00
OD_SPHERE: +0.25

## 2025-07-21 ASSESSMENT — VISUAL ACUITY
OS_BCVA: 20/20
OD_BCVA: CF@1FT

## 2025-07-21 ASSESSMENT — CONFRONTATIONAL VISUAL FIELD TEST (CVF)
OS_FINDINGS: FULL
OD_FINDINGS: FULL

## 2025-08-09 ENCOUNTER — OUTPATIENT (OUTPATIENT)
Dept: OUTPATIENT SERVICES | Facility: HOSPITAL | Age: 75
LOS: 1 days | End: 2025-08-09
Payer: MEDICARE

## 2025-08-09 ENCOUNTER — APPOINTMENT (OUTPATIENT)
Dept: NUCLEAR MEDICINE | Facility: IMAGING CENTER | Age: 75
End: 2025-08-09

## 2025-08-09 ENCOUNTER — APPOINTMENT (OUTPATIENT)
Dept: CT IMAGING | Facility: IMAGING CENTER | Age: 75
End: 2025-08-09

## 2025-08-09 DIAGNOSIS — Z90.710 ACQUIRED ABSENCE OF BOTH CERVIX AND UTERUS: Chronic | ICD-10-CM

## 2025-08-09 DIAGNOSIS — Z98.890 OTHER SPECIFIED POSTPROCEDURAL STATES: Chronic | ICD-10-CM

## 2025-08-09 DIAGNOSIS — Z00.8 ENCOUNTER FOR OTHER GENERAL EXAMINATION: ICD-10-CM

## 2025-08-09 DIAGNOSIS — Z98.89 OTHER SPECIFIED POSTPROCEDURAL STATES: Chronic | ICD-10-CM

## 2025-08-09 DIAGNOSIS — Z90.49 ACQUIRED ABSENCE OF OTHER SPECIFIED PARTS OF DIGESTIVE TRACT: Chronic | ICD-10-CM

## 2025-08-09 PROCEDURE — 78816 PET IMAGE W/CT FULL BODY: CPT | Mod: 26,PS

## 2025-08-09 PROCEDURE — 70480 CT ORBIT/EAR/FOSSA W/O DYE: CPT

## 2025-08-09 PROCEDURE — 70480 CT ORBIT/EAR/FOSSA W/O DYE: CPT | Mod: 26

## 2025-08-09 PROCEDURE — 78816 PET IMAGE W/CT FULL BODY: CPT

## 2025-08-09 PROCEDURE — A9552: CPT

## 2025-08-20 ENCOUNTER — APPOINTMENT (OUTPATIENT)
Dept: ULTRASOUND IMAGING | Facility: CLINIC | Age: 75
End: 2025-08-20